# Patient Record
Sex: FEMALE | Race: OTHER | HISPANIC OR LATINO | Employment: OTHER | URBAN - METROPOLITAN AREA
[De-identification: names, ages, dates, MRNs, and addresses within clinical notes are randomized per-mention and may not be internally consistent; named-entity substitution may affect disease eponyms.]

---

## 2018-10-12 ENCOUNTER — TRANSCRIBE ORDERS (OUTPATIENT)
Dept: LAB | Facility: HOSPITAL | Age: 63
End: 2018-10-12

## 2018-10-12 ENCOUNTER — APPOINTMENT (OUTPATIENT)
Dept: LAB | Facility: HOSPITAL | Age: 63
End: 2018-10-12
Payer: MEDICARE

## 2018-10-12 DIAGNOSIS — Z00.01 ENCOUNTER FOR GENERAL ADULT MEDICAL EXAMINATION WITH ABNORMAL FINDINGS: Primary | ICD-10-CM

## 2018-10-12 DIAGNOSIS — R73.01 IMPAIRED FASTING GLUCOSE: ICD-10-CM

## 2018-10-12 DIAGNOSIS — E55.9 AVITAMINOSIS D: ICD-10-CM

## 2018-10-12 DIAGNOSIS — Z00.01 ENCOUNTER FOR GENERAL ADULT MEDICAL EXAMINATION WITH ABNORMAL FINDINGS: ICD-10-CM

## 2018-10-12 DIAGNOSIS — Z11.59 SCREENING EXAMINATION FOR POLIOMYELITIS: ICD-10-CM

## 2018-10-12 LAB
25(OH)D3 SERPL-MCNC: 20.8 NG/ML (ref 30–100)
ALBUMIN SERPL BCP-MCNC: 3.8 G/DL (ref 3.5–5)
ALP SERPL-CCNC: 75 U/L (ref 46–116)
ALT SERPL W P-5'-P-CCNC: 29 U/L (ref 12–78)
ANION GAP SERPL CALCULATED.3IONS-SCNC: 5 MMOL/L (ref 4–13)
AST SERPL W P-5'-P-CCNC: 21 U/L (ref 5–45)
BASOPHILS # BLD AUTO: 0.04 THOUSANDS/ΜL (ref 0–0.1)
BASOPHILS NFR BLD AUTO: 1 % (ref 0–1)
BILIRUB SERPL-MCNC: 0.43 MG/DL (ref 0.2–1)
BUN SERPL-MCNC: 10 MG/DL (ref 5–25)
CALCIUM SERPL-MCNC: 9.3 MG/DL (ref 8.3–10.1)
CHLORIDE SERPL-SCNC: 105 MMOL/L (ref 100–108)
CHOLEST SERPL-MCNC: 234 MG/DL (ref 50–200)
CO2 SERPL-SCNC: 26 MMOL/L (ref 21–32)
CREAT SERPL-MCNC: 0.89 MG/DL (ref 0.6–1.3)
EOSINOPHIL # BLD AUTO: 0.13 THOUSAND/ΜL (ref 0–0.61)
EOSINOPHIL NFR BLD AUTO: 2 % (ref 0–6)
ERYTHROCYTE [DISTWIDTH] IN BLOOD BY AUTOMATED COUNT: 12.9 % (ref 11.6–15.1)
EST. AVERAGE GLUCOSE BLD GHB EST-MCNC: 128 MG/DL
GFR SERPL CREATININE-BSD FRML MDRD: 68 ML/MIN/1.73SQ M
GLUCOSE P FAST SERPL-MCNC: 83 MG/DL (ref 65–99)
HBA1C MFR BLD: 6.1 % (ref 4.2–6.3)
HCT VFR BLD AUTO: 39.8 % (ref 34.8–46.1)
HCV AB SER QL: NORMAL
HDLC SERPL-MCNC: 54 MG/DL (ref 40–60)
HGB BLD-MCNC: 12.9 G/DL (ref 11.5–15.4)
IMM GRANULOCYTES # BLD AUTO: 0.01 THOUSAND/UL (ref 0–0.2)
IMM GRANULOCYTES NFR BLD AUTO: 0 % (ref 0–2)
LDLC SERPL CALC-MCNC: 142 MG/DL (ref 0–100)
LYMPHOCYTES # BLD AUTO: 1.86 THOUSANDS/ΜL (ref 0.6–4.47)
LYMPHOCYTES NFR BLD AUTO: 33 % (ref 14–44)
MCH RBC QN AUTO: 27.9 PG (ref 26.8–34.3)
MCHC RBC AUTO-ENTMCNC: 32.4 G/DL (ref 31.4–37.4)
MCV RBC AUTO: 86 FL (ref 82–98)
MONOCYTES # BLD AUTO: 0.43 THOUSAND/ΜL (ref 0.17–1.22)
MONOCYTES NFR BLD AUTO: 8 % (ref 4–12)
NEUTROPHILS # BLD AUTO: 3.14 THOUSANDS/ΜL (ref 1.85–7.62)
NEUTS SEG NFR BLD AUTO: 56 % (ref 43–75)
NONHDLC SERPL-MCNC: 180 MG/DL
NRBC BLD AUTO-RTO: 0 /100 WBCS
PLATELET # BLD AUTO: 385 THOUSANDS/UL (ref 149–390)
PMV BLD AUTO: 10.1 FL (ref 8.9–12.7)
POTASSIUM SERPL-SCNC: 4.3 MMOL/L (ref 3.5–5.3)
PROT SERPL-MCNC: 7.3 G/DL (ref 6.4–8.2)
RBC # BLD AUTO: 4.62 MILLION/UL (ref 3.81–5.12)
SODIUM SERPL-SCNC: 136 MMOL/L (ref 136–145)
TRIGL SERPL-MCNC: 192 MG/DL
TSH SERPL DL<=0.05 MIU/L-ACNC: 3.5 UIU/ML (ref 0.36–3.74)
WBC # BLD AUTO: 5.61 THOUSAND/UL (ref 4.31–10.16)

## 2018-10-12 PROCEDURE — 85025 COMPLETE CBC W/AUTO DIFF WBC: CPT

## 2018-10-12 PROCEDURE — 87086 URINE CULTURE/COLONY COUNT: CPT

## 2018-10-12 PROCEDURE — 83036 HEMOGLOBIN GLYCOSYLATED A1C: CPT

## 2018-10-12 PROCEDURE — 80053 COMPREHEN METABOLIC PANEL: CPT

## 2018-10-12 PROCEDURE — 36415 COLL VENOUS BLD VENIPUNCTURE: CPT

## 2018-10-12 PROCEDURE — 80061 LIPID PANEL: CPT

## 2018-10-12 PROCEDURE — 86803 HEPATITIS C AB TEST: CPT

## 2018-10-12 PROCEDURE — 84443 ASSAY THYROID STIM HORMONE: CPT

## 2018-10-12 PROCEDURE — 82306 VITAMIN D 25 HYDROXY: CPT

## 2018-10-12 PROCEDURE — 87147 CULTURE TYPE IMMUNOLOGIC: CPT

## 2018-10-13 LAB
BACTERIA UR CULT: ABNORMAL
BACTERIA UR CULT: ABNORMAL

## 2019-03-20 ENCOUNTER — TRANSCRIBE ORDERS (OUTPATIENT)
Dept: ADMINISTRATIVE | Facility: HOSPITAL | Age: 64
End: 2019-03-20

## 2019-03-20 DIAGNOSIS — Z12.31 ENCOUNTER FOR SCREENING MAMMOGRAM FOR MALIGNANT NEOPLASM OF BREAST: ICD-10-CM

## 2019-03-20 DIAGNOSIS — Z13.6 SCREENING FOR AAA (AORTIC ABDOMINAL ANEURYSM): Primary | ICD-10-CM

## 2019-04-01 ENCOUNTER — HOSPITAL ENCOUNTER (OUTPATIENT)
Dept: RADIOLOGY | Facility: HOSPITAL | Age: 64
Discharge: HOME | End: 2019-04-01
Payer: COMMERCIAL

## 2019-04-01 VITALS — HEIGHT: 58 IN | WEIGHT: 110 LBS | BODY MASS INDEX: 23.09 KG/M2

## 2019-04-01 DIAGNOSIS — Z12.31 ENCOUNTER FOR SCREENING MAMMOGRAM FOR MALIGNANT NEOPLASM OF BREAST: ICD-10-CM

## 2019-04-01 DIAGNOSIS — Z87.891 SMOKING HISTORY: ICD-10-CM

## 2019-04-01 DIAGNOSIS — Z13.6 SCREENING FOR AAA (AORTIC ABDOMINAL ANEURYSM): ICD-10-CM

## 2019-04-01 PROCEDURE — 77067 SCR MAMMO BI INCL CAD: CPT

## 2019-04-01 PROCEDURE — 76706 US ABDL AORTA SCREEN AAA: CPT

## 2019-04-03 ENCOUNTER — LAB REQUISITION (OUTPATIENT)
Dept: LAB | Facility: HOSPITAL | Age: 64
End: 2019-04-03
Payer: COMMERCIAL

## 2019-04-03 DIAGNOSIS — Z12.11 ENCOUNTER FOR SCREENING FOR MALIGNANT NEOPLASM OF COLON: ICD-10-CM

## 2019-04-03 DIAGNOSIS — Z00.00 ENCOUNTER FOR GENERAL ADULT MEDICAL EXAMINATION WITHOUT ABNORMAL FINDINGS: ICD-10-CM

## 2019-04-03 LAB — HEMOCCULT STL QL IA: NEGATIVE

## 2019-04-03 PROCEDURE — G0328 FECAL BLOOD SCRN IMMUNOASSAY: HCPCS | Performed by: FAMILY MEDICINE

## 2019-04-05 ENCOUNTER — HOSPITAL ENCOUNTER (OUTPATIENT)
Dept: ULTRASOUND IMAGING | Facility: CLINIC | Age: 64
Discharge: HOME | End: 2019-04-05
Payer: COMMERCIAL

## 2019-04-05 ENCOUNTER — HOSPITAL ENCOUNTER (OUTPATIENT)
Dept: MAMMOGRAPHY | Facility: CLINIC | Age: 64
Discharge: HOME | End: 2019-04-05
Payer: COMMERCIAL

## 2019-04-05 VITALS — WEIGHT: 110 LBS | HEIGHT: 58 IN | BODY MASS INDEX: 23.09 KG/M2

## 2019-04-05 DIAGNOSIS — R92.8 ABNORMAL MAMMOGRAM: ICD-10-CM

## 2019-04-05 PROCEDURE — 76642 ULTRASOUND BREAST LIMITED: CPT

## 2019-04-05 PROCEDURE — 77065 DX MAMMO INCL CAD UNI: CPT

## 2019-09-10 ENCOUNTER — TRANSCRIBE ORDERS (OUTPATIENT)
Dept: ADMINISTRATIVE | Facility: HOSPITAL | Age: 64
End: 2019-09-10

## 2019-09-10 DIAGNOSIS — R92.8 ABNORMAL MAMMOGRAM: Primary | ICD-10-CM

## 2019-09-10 DIAGNOSIS — R01.1 MURMUR: ICD-10-CM

## 2019-10-17 ENCOUNTER — HOSPITAL ENCOUNTER (OUTPATIENT)
Dept: MAMMOGRAPHY | Facility: CLINIC | Age: 64
Discharge: HOME | End: 2019-10-17
Payer: COMMERCIAL

## 2019-10-17 ENCOUNTER — HOSPITAL ENCOUNTER (OUTPATIENT)
Dept: ULTRASOUND IMAGING | Facility: CLINIC | Age: 64
Discharge: HOME | End: 2019-10-17
Payer: COMMERCIAL

## 2019-10-17 DIAGNOSIS — R92.8 ABNORMAL MAMMOGRAM: ICD-10-CM

## 2019-10-17 PROCEDURE — 77065 DX MAMMO INCL CAD UNI: CPT

## 2019-10-28 ENCOUNTER — TRANSCRIBE ORDERS (OUTPATIENT)
Dept: LAB | Facility: HOSPITAL | Age: 64
End: 2019-10-28

## 2019-10-28 ENCOUNTER — APPOINTMENT (OUTPATIENT)
Dept: LAB | Facility: HOSPITAL | Age: 64
End: 2019-10-28
Payer: COMMERCIAL

## 2019-10-28 DIAGNOSIS — R20.9 DISTURBANCE OF SKIN SENSATION: ICD-10-CM

## 2019-10-28 DIAGNOSIS — E78.00 PURE HYPERCHOLESTEROLEMIA: ICD-10-CM

## 2019-10-28 DIAGNOSIS — R73.01 IMPAIRED FASTING GLUCOSE: ICD-10-CM

## 2019-10-28 DIAGNOSIS — E55.9 AVITAMINOSIS D: ICD-10-CM

## 2019-10-28 DIAGNOSIS — R73.01 IMPAIRED FASTING GLUCOSE: Primary | ICD-10-CM

## 2019-10-28 LAB
25(OH)D3 SERPL-MCNC: 14.5 NG/ML (ref 30–100)
ALBUMIN SERPL BCP-MCNC: 3.9 G/DL (ref 3.5–5)
ALP SERPL-CCNC: 82 U/L (ref 46–116)
ALT SERPL W P-5'-P-CCNC: 24 U/L (ref 12–78)
ANION GAP SERPL CALCULATED.3IONS-SCNC: 5 MMOL/L (ref 4–13)
AST SERPL W P-5'-P-CCNC: 16 U/L (ref 5–45)
BASOPHILS # BLD AUTO: 0.05 THOUSANDS/ΜL (ref 0–0.1)
BASOPHILS NFR BLD AUTO: 1 % (ref 0–1)
BILIRUB SERPL-MCNC: 0.42 MG/DL (ref 0.2–1)
BUN SERPL-MCNC: 15 MG/DL (ref 5–25)
CALCIUM SERPL-MCNC: 9.6 MG/DL (ref 8.3–10.1)
CHLORIDE SERPL-SCNC: 106 MMOL/L (ref 100–108)
CHOLEST SERPL-MCNC: 247 MG/DL (ref 50–200)
CO2 SERPL-SCNC: 28 MMOL/L (ref 21–32)
CREAT SERPL-MCNC: 0.83 MG/DL (ref 0.6–1.3)
CRP SERPL QL: 9.3 MG/L
EOSINOPHIL # BLD AUTO: 0.08 THOUSAND/ΜL (ref 0–0.61)
EOSINOPHIL NFR BLD AUTO: 1 % (ref 0–6)
ERYTHROCYTE [DISTWIDTH] IN BLOOD BY AUTOMATED COUNT: 13.3 % (ref 11.6–15.1)
EST. AVERAGE GLUCOSE BLD GHB EST-MCNC: 117 MG/DL
FOLATE SERPL-MCNC: 17.9 NG/ML (ref 3.1–17.5)
GFR SERPL CREATININE-BSD FRML MDRD: 74 ML/MIN/1.73SQ M
GLUCOSE P FAST SERPL-MCNC: 89 MG/DL (ref 65–99)
HBA1C MFR BLD: 5.7 % (ref 4.2–6.3)
HCT VFR BLD AUTO: 40.3 % (ref 34.8–46.1)
HDLC SERPL-MCNC: 65 MG/DL
HGB BLD-MCNC: 12.7 G/DL (ref 11.5–15.4)
IMM GRANULOCYTES # BLD AUTO: 0.01 THOUSAND/UL (ref 0–0.2)
IMM GRANULOCYTES NFR BLD AUTO: 0 % (ref 0–2)
LDLC SERPL CALC-MCNC: 160 MG/DL (ref 0–100)
LYMPHOCYTES # BLD AUTO: 1.49 THOUSANDS/ΜL (ref 0.6–4.47)
LYMPHOCYTES NFR BLD AUTO: 24 % (ref 14–44)
MCH RBC QN AUTO: 27.7 PG (ref 26.8–34.3)
MCHC RBC AUTO-ENTMCNC: 31.5 G/DL (ref 31.4–37.4)
MCV RBC AUTO: 88 FL (ref 82–98)
MONOCYTES # BLD AUTO: 0.41 THOUSAND/ΜL (ref 0.17–1.22)
MONOCYTES NFR BLD AUTO: 7 % (ref 4–12)
NEUTROPHILS # BLD AUTO: 4.25 THOUSANDS/ΜL (ref 1.85–7.62)
NEUTS SEG NFR BLD AUTO: 67 % (ref 43–75)
NONHDLC SERPL-MCNC: 182 MG/DL
NRBC BLD AUTO-RTO: 0 /100 WBCS
PLATELET # BLD AUTO: 391 THOUSANDS/UL (ref 149–390)
PMV BLD AUTO: 10.2 FL (ref 8.9–12.7)
POTASSIUM SERPL-SCNC: 3.8 MMOL/L (ref 3.5–5.3)
PROT SERPL-MCNC: 7.8 G/DL (ref 6.4–8.2)
RBC # BLD AUTO: 4.59 MILLION/UL (ref 3.81–5.12)
SODIUM SERPL-SCNC: 139 MMOL/L (ref 136–145)
TRIGL SERPL-MCNC: 112 MG/DL
VIT B12 SERPL-MCNC: 1583 PG/ML (ref 100–900)
WBC # BLD AUTO: 6.29 THOUSAND/UL (ref 4.31–10.16)

## 2019-10-28 PROCEDURE — 85025 COMPLETE CBC W/AUTO DIFF WBC: CPT

## 2019-10-28 PROCEDURE — 80061 LIPID PANEL: CPT

## 2019-10-28 PROCEDURE — 82306 VITAMIN D 25 HYDROXY: CPT

## 2019-10-28 PROCEDURE — 83036 HEMOGLOBIN GLYCOSYLATED A1C: CPT

## 2019-10-28 PROCEDURE — 80053 COMPREHEN METABOLIC PANEL: CPT

## 2019-10-28 PROCEDURE — 82607 VITAMIN B-12: CPT

## 2019-10-28 PROCEDURE — 86140 C-REACTIVE PROTEIN: CPT

## 2019-10-28 PROCEDURE — 82746 ASSAY OF FOLIC ACID SERUM: CPT

## 2019-10-28 PROCEDURE — 36415 COLL VENOUS BLD VENIPUNCTURE: CPT

## 2019-10-31 ENCOUNTER — HOSPITAL ENCOUNTER (OUTPATIENT)
Dept: NON INVASIVE DIAGNOSTICS | Facility: HOSPITAL | Age: 64
Discharge: HOME | End: 2019-10-31
Payer: COMMERCIAL

## 2019-10-31 DIAGNOSIS — R01.1 MURMUR: ICD-10-CM

## 2019-10-31 PROCEDURE — 93306 TTE W/DOPPLER COMPLETE: CPT

## 2019-10-31 PROCEDURE — 93306 TTE W/DOPPLER COMPLETE: CPT | Performed by: INTERNAL MEDICINE

## 2020-01-31 ENCOUNTER — TRANSCRIBE ORDERS (OUTPATIENT)
Dept: LAB | Facility: CLINIC | Age: 65
End: 2020-01-31

## 2020-01-31 DIAGNOSIS — E78.00 PURE HYPERCHOLESTEROLEMIA: Primary | ICD-10-CM

## 2020-01-31 DIAGNOSIS — R73.09 OTHER ABNORMAL GLUCOSE: ICD-10-CM

## 2020-02-06 ENCOUNTER — HOSPITAL ENCOUNTER (OUTPATIENT)
Dept: RADIOLOGY | Facility: HOSPITAL | Age: 65
Discharge: HOME | End: 2020-02-06
Payer: COMMERCIAL

## 2020-02-06 ENCOUNTER — TRANSCRIBE ORDERS (OUTPATIENT)
Dept: RADIOLOGY | Facility: HOSPITAL | Age: 65
End: 2020-02-06

## 2020-02-06 DIAGNOSIS — R05.9 COUGH: Primary | ICD-10-CM

## 2020-02-06 DIAGNOSIS — R05.9 COUGH: ICD-10-CM

## 2020-02-06 PROCEDURE — 71046 X-RAY EXAM CHEST 2 VIEWS: CPT

## 2020-06-03 ENCOUNTER — TRANSCRIBE ORDERS (OUTPATIENT)
Dept: ADMINISTRATIVE | Facility: HOSPITAL | Age: 65
End: 2020-06-03

## 2020-06-03 DIAGNOSIS — N63.0 LUMP IN FEMALE BREAST: Primary | ICD-10-CM

## 2020-06-22 ENCOUNTER — APPOINTMENT (OUTPATIENT)
Dept: ULTRASOUND IMAGING | Facility: CLINIC | Age: 65
End: 2020-06-22
Payer: COMMERCIAL

## 2020-06-22 ENCOUNTER — HOSPITAL ENCOUNTER (OUTPATIENT)
Dept: MAMMOGRAPHY | Facility: CLINIC | Age: 65
Discharge: HOME | End: 2020-06-22
Payer: COMMERCIAL

## 2020-06-22 VITALS — TEMPERATURE: 97.7 F | WEIGHT: 110 LBS | BODY MASS INDEX: 23.09 KG/M2 | HEIGHT: 58 IN

## 2020-06-22 DIAGNOSIS — R92.8 ABNORMAL MAMMOGRAM: ICD-10-CM

## 2020-06-22 DIAGNOSIS — N63.0 LUMP IN FEMALE BREAST: ICD-10-CM

## 2020-06-22 PROCEDURE — 77066 DX MAMMO INCL CAD BI: CPT

## 2020-06-22 PROCEDURE — G0279 TOMOSYNTHESIS, MAMMO: HCPCS

## 2020-06-22 NOTE — PROGRESS NOTES
Met with patient and Dr Jermaine Bolivar regarding recommendation for;      __x___ RIGHT ______LEFT      _____Ultrasound guided  ___x___Stereotactic  Breast biopsy  __x___Verbalized understanding        Blood thinners:  _____yes __x___no    Date stopped: ___n/a________    Biopsy teaching sheet given:  ___x____yes ______no

## 2020-07-01 ENCOUNTER — HOSPITAL ENCOUNTER (OUTPATIENT)
Dept: MAMMOGRAPHY | Facility: CLINIC | Age: 65
Discharge: HOME | End: 2020-07-01
Admitting: RADIOLOGY
Payer: COMMERCIAL

## 2020-07-01 ENCOUNTER — HOSPITAL ENCOUNTER (OUTPATIENT)
Dept: MAMMOGRAPHY | Facility: CLINIC | Age: 65
Discharge: HOME | End: 2020-07-01
Payer: COMMERCIAL

## 2020-07-01 VITALS — DIASTOLIC BLOOD PRESSURE: 88 MMHG | TEMPERATURE: 96.3 F | HEART RATE: 78 BPM | SYSTOLIC BLOOD PRESSURE: 124 MMHG

## 2020-07-01 DIAGNOSIS — R92.8 ABNORMAL MAMMOGRAM: ICD-10-CM

## 2020-07-01 PROCEDURE — 88305 TISSUE EXAM BY PATHOLOGIST: CPT | Performed by: PATHOLOGY

## 2020-07-01 PROCEDURE — 88361 TUMOR IMMUNOHISTOCHEM/COMPUT: CPT | Performed by: PATHOLOGY

## 2020-07-01 PROCEDURE — 19081 BX BREAST 1ST LESION STRTCTC: CPT

## 2020-07-01 RX ORDER — LIDOCAINE HYDROCHLORIDE AND EPINEPHRINE 10; 10 MG/ML; UG/ML
1 INJECTION, SOLUTION INFILTRATION; PERINEURAL ONCE
Status: COMPLETED | OUTPATIENT
Start: 2020-07-01 | End: 2020-07-01

## 2020-07-01 RX ORDER — LIDOCAINE HYDROCHLORIDE 10 MG/ML
5 INJECTION, SOLUTION EPIDURAL; INFILTRATION; INTRACAUDAL; PERINEURAL ONCE
Status: COMPLETED | OUTPATIENT
Start: 2020-07-01 | End: 2020-07-01

## 2020-07-01 RX ADMIN — LIDOCAINE HYDROCHLORIDE 5 ML: 10 INJECTION, SOLUTION EPIDURAL; INFILTRATION; INTRACAUDAL; PERINEURAL at 13:03

## 2020-07-01 RX ADMIN — LIDOCAINE HYDROCHLORIDE,EPINEPHRINE BITARTRATE 1 ML: 10; .01 INJECTION, SOLUTION INFILTRATION; PERINEURAL at 13:03

## 2020-07-01 NOTE — DISCHARGE INSTR - OTHER ORDERS
POST LARGE CORE BREAST BIOPSY PATIENT INFORMATION      1  Place an ice pack inside your bra over the top of the dressing every hour for 20 minutes (20 minutes on, 60 minutes off)  Do this until bedtime  2  Do not shower or bathe until the following morning  3  You may bathe your breast carefully with the steri-strips in place  Be careful    Not to loosen them  The steri-strips will fall off in 3-5 days  4  You may have mild discomfort, and you may have some bruising where the   Needle entered the skin  This should clear within 5-7 days  5  If you need medicine for discomfort, take acetaminophen products such as   Tylenol  You may also take Advil or Motrin products  6  Do not participate in strenuous activities such as-tennis, aerobics, skiing,  Weight lifting, etc  for 24 hours  Refrain from swimming/soaking for 72 hours  7  Wearing a bra for sleeping may be more comfortable for the first 24-48 hours  8  Watch for continued bleeding, pain or fever over 101; please call with any questions or concerns  For procedures done at the [facility]  [address] call:  Annie Jordan RN at [phone]  Nubia Hawley RN at [phone]                    *After 4 PM call the Interventional Radiology Department                    [phone] and ask to speak with the nurse on call  For procedures done at the [address] [place] [address] call:         Rena Collet RN at   *After 4 PM call the Interventional Radiology Department   [phone] and ask to speak with the nurse on call  For procedures done at [address] [place] [address] call: The Radiology Nurse at [phone]  *After 4 PM call your physician, or go to the Emergency Department  9          The final results of your biopsy are usually available within one week

## 2020-07-01 NOTE — PROGRESS NOTES
Procedure type:    _____ultrasound guided ___x__stereotactic    Breast:    _____Left __x___Right    Location: 10 oclock    Needle: 8g Revolve    # of passes: 5 cores (3 cores with calcs, 2 cores without calcs)    Clip: Mammomark triple twist    Performed by: Dr Radha Sanches held for 5 minutes by: Erica Ulrich Strips:    __x___yes _____no    Band aid:    __x___yes_____no    Tape and guaze:    _____yes __x___no    Tolerated procedure:    __x___yes _____no

## 2020-07-01 NOTE — PROGRESS NOTES
Ice pack given:    ___x__yes _____no    Discharge instructions signed by patient:    __x___yes _____no    Discharge instructions given to patient:    __x___yes _____no    Discharged via:    __x___amulatory    _____wheelchair    _____stretcher    Stable on discharge:    __x___yes ____no

## 2020-07-02 ENCOUNTER — TELEPHONE (OUTPATIENT)
Dept: SURGICAL ONCOLOGY | Facility: CLINIC | Age: 65
End: 2020-07-02

## 2020-07-02 NOTE — TELEPHONE ENCOUNTER
New Patient Encounter    New Patient Intake Form   Patient Details:  Morteza Puckett  1955  [id number]    Background Information:  [address] starts by opening a telephone encounter and gathering the following information   Who is calling to schedule? If not self, relationship to patient? provider   Referring Provider [facility]   What is the diagnosis? Right DCIS   Is this diagnosis confirmed? Yes   When was the diagnosis? 7/2   Is there a confirmed diagnosis from a biopsy/tissue reviewed by pathology? yes   Is patient aware of diagnosis? Yes   Is there a personal history of cancer and what kind? no   Is there a family history of cancer and what kind? no   Reason for visit? New Diagnosis   Have you had any imaging or labs done? If so: when, where? Yes  [facility]   Are records in EPIC? yes   Was the patient told to bring a disk? no   Does the patient smoke or Vape? no   If yes, how many packs or cartridges per day? na   Scheduling Information:   Preferred [place]: [facility]     Are there any dates/time the patient cannot be seen? na   Miscellaneous: na   After completing the above information, please route to Financial Counselor and the appropriate Nurse Navigator for review

## 2020-07-06 ENCOUNTER — TELEPHONE (OUTPATIENT)
Dept: MAMMOGRAPHY | Facility: CLINIC | Age: 65
End: 2020-07-06

## 2020-07-06 NOTE — PROGRESS NOTES
Post procedure call completed    Bleeding: _____yes __x___no    Pain: __x___yes ______no "sore"    Redness/Swelling: ______yes ___x___no    Band aid removed: __x___yes _____no    Steri-Strips intact: ___x___yes _____no    Lu Mack has bruising

## 2020-07-14 ENCOUNTER — TELEPHONE (OUTPATIENT)
Dept: SURGICAL ONCOLOGY | Facility: CLINIC | Age: 65
End: 2020-07-14

## 2020-07-15 PROBLEM — D05.11 DUCTAL CARCINOMA IN SITU (DCIS) OF RIGHT BREAST: Status: ACTIVE | Noted: 2020-07-15

## 2020-07-16 ENCOUNTER — CONSULT (OUTPATIENT)
Dept: SURGICAL ONCOLOGY | Facility: CLINIC | Age: 65
End: 2020-07-16
Payer: COMMERCIAL

## 2020-07-16 ENCOUNTER — TRANSCRIBE ORDERS (OUTPATIENT)
Dept: LAB | Facility: CLINIC | Age: 65
End: 2020-07-16

## 2020-07-16 ENCOUNTER — APPOINTMENT (OUTPATIENT)
Dept: LAB | Facility: CLINIC | Age: 65
End: 2020-07-16
Payer: COMMERCIAL

## 2020-07-16 VITALS
RESPIRATION RATE: 16 BRPM | SYSTOLIC BLOOD PRESSURE: 122 MMHG | TEMPERATURE: 97.8 F | HEIGHT: 58 IN | HEART RATE: 90 BPM | DIASTOLIC BLOOD PRESSURE: 84 MMHG | BODY MASS INDEX: 25.19 KG/M2 | WEIGHT: 120 LBS

## 2020-07-16 DIAGNOSIS — D05.11 INTRADUCTAL CARCINOMA IN SITU OF RIGHT BREAST: ICD-10-CM

## 2020-07-16 DIAGNOSIS — D05.11 INTRADUCTAL CARCINOMA IN SITU OF RIGHT BREAST: Primary | ICD-10-CM

## 2020-07-16 DIAGNOSIS — D05.11 DUCTAL CARCINOMA IN SITU (DCIS) OF RIGHT BREAST: Primary | ICD-10-CM

## 2020-07-16 PROCEDURE — 99205 OFFICE O/P NEW HI 60 MIN: CPT | Performed by: SURGERY

## 2020-07-16 PROCEDURE — 36415 COLL VENOUS BLD VENIPUNCTURE: CPT

## 2020-07-16 NOTE — PROGRESS NOTES
Surgical Oncology Consult Note       [address] [facility]  [facility] [place]  [address]  [place] [address]    Lissette Cherylleonidbrenda  1955  [id number]  [address] [facility]  [facility] [place]  [address] [facility] [address]      Chief Complaint:     Chief Complaint   Patient presents with    Consult       Assessment and Plan:   Assessment/Plan   Patient presents with a new diagnosis of ductal carcinoma in situ based on microcalcifications the cover an area of approximately 6-7 cm  This was grade 1 ER [place] strongly positive  I have recommended a mastectomy, explaining that even if a 2nd area was biopsied and was negative leaving the calcifications behind would cause any significance concern for recurrence  Patient was agreeable to seeing a plastic surgeon in moving forward with plans for mastectomy  She presents with her daughter  They are considering genetic testing  Oncology History:        Ductal carcinoma in situ (DCIS) of right breast    7/1/2020 Biopsy     Right breast stereotactic biopsy  A  10 o'clock, with calcifications  Ductal carcinoma in situ  Grade 1        B  10 o'clock, without calcifications  Ductal carcinoma in situ   Grade 1    Concordant  Appears multifocal  Calcifications cover an area of potentially up to 6 cm in AP dimension  US of right axilla not performed  Left breast clear  History of Present Illness: This is a 59-year-old woman who in April of 2019 had a mammogram which demonstrated calcifications in the right upper outer quadrant  Six-month follow-up mammogram was recommended which demonstrated stable findings  She had a repeat six-month follow-up mammogram (bilateral diagnostic) which demonstrated the microcalcifications had increased in number and a biopsy was recommended and performed (stereotactic)    The biopsy demonstrated ductal carcinoma in situ, grade 1, % %  The calcifications spanning area approximately 6 cm from anterior to posterior  Review of Systems:   Review of Systems   Constitutional: Negative for activity change, appetite change and fatigue  HENT: Negative  Eyes: Negative  Respiratory: Negative for cough, shortness of breath and wheezing  Cardiovascular: Negative for chest pain and leg swelling  Gastrointestinal: Negative  Endocrine: Negative  Genitourinary: Negative  Musculoskeletal:        No new changes or complaints of bone pain   Skin: Negative  Allergic/Immunologic: Negative  Neurological: Negative  Hematological: Negative  Psychiatric/Behavioral: Negative  Past Medical History:      Patient Active Problem List   Diagnosis    Ductal carcinoma in situ (DCIS) of right breast      No past medical history on file       Past Surgical History:   Procedure Laterality Date    MAMMO STEREOTACTIC BREAST BIOPSY RIGHT (ALL INC) Right 7/1/2020        Family History   Problem Relation Age of Onset    Endometrial cancer Mother 35    No Known Problems Father     No Known Problems Sister     No Known Problems Daughter     No Known Problems Maternal Grandmother     No Known Problems Maternal Grandfather     No Known Problems Paternal Grandmother     No Known Problems Paternal Grandfather     No Known Problems Maternal Aunt     No Known Problems Maternal Aunt     No Known Problems Paternal Aunt     No Known Problems Paternal Aunt     No Known Problems Paternal Aunt         Social History     Socioeconomic History    Marital status: Single     Spouse name: Not on file    Number of children: Not on file    Years of education: Not on file    Highest education level: Not on file   Occupational History    Not on file   Social Needs    Financial resource strain: Not on file    Food insecurity:     Worry: Not on file     Inability: Not on file    Transportation needs:     Medical: Not on file     Non-medical: Not on file   Tobacco Use    Smoking status: Not on file   Substance and Sexual Activity    Alcohol use: Not on file    Drug use: Not on file    Sexual activity: Not on file   Lifestyle    Physical activity:     Days per week: Not on file     Minutes per session: Not on file    Stress: Not on file   Relationships    Social connections:     Talks on phone: Not on file     Gets together: Not on file     Attends Buddhism service: Not on file     Active member of club or organization: Not on file     Attends meetings of clubs or organizations: Not on file     Relationship status: Not on file    Intimate partner violence:     Fear of current or ex partner: Not on file     Emotionally abused: Not on file     Physically abused: Not on file     Forced sexual activity: Not on file   Other Topics Concern    Not on file   Social History Narrative    Not on file        Current Outpatient Medications:     CALCIUM PO, Take by mouth, Disp: , Rfl:     Cholecalciferol (VITAMIN D3) 125 MCG (5000 UT) TABS, Take 5,000 Units by mouth daily, Disp: , Rfl:     Cyanocobalamin (B-12 PO), Take by mouth, Disp: , Rfl:     Omega-3 Fatty Acids (FISH OIL PO), Take by mouth, Disp: , Rfl:     TURMERIC CURCUMIN PO, Take by mouth, Disp: , Rfl:      Allergies   Allergen Reactions    Codeine Vomiting    Penicillins Hives       Physical Exam:     Vitals:    07/16/20 0901   BP: 122/84   Pulse: 90   Resp: 16   Temp: 97 8 °F (36 6 °C)     Physical Exam   Constitutional: She is oriented to person, place, and time  She appears well-developed and well-nourished  HENT:   Head: Normocephalic and atraumatic  Mouth/Throat: Oropharynx is clear and moist    Eyes: Pupils are equal, round, and reactive to light  EOM are normal    Neck: Normal range of motion  Neck supple  No JVD present  No tracheal deviation present  No thyromegaly present     Cardiovascular: Normal rate, regular rhythm, normal heart sounds and intact distal pulses  Exam reveals no gallop and no friction rub  No murmur heard  Pulmonary/Chest: Effort normal and breath sounds normal  No respiratory distress  She has no wheezes  She has no rales  Examination of the right breast demonstrates ecchymosis on the upper outer quadrant  There is a dominant mass which was not there prior to the biopsy according to the patient  Is consistent with a small hematoma  There are no other masses within the breast there are no worrisome skin findings  There is no axillary adenopathy  Examination of the left breast in both the sitting and supine position demonstrate no skin changes nipple discharge dominant masses or axillary adenopathy  Abdominal: Soft  She exhibits no distension and no mass  There is no hepatomegaly  There is no tenderness  There is no rebound and no guarding  Musculoskeletal: Normal range of motion  She exhibits no edema or tenderness  Lymphadenopathy:     She has no cervical adenopathy  Neurological: She is alert and oriented to person, place, and time  No cranial nerve deficit  Skin: Skin is warm and dry  No rash noted  No erythema  Psychiatric: She has a normal mood and affect  Her behavior is normal    Vitals reviewed  Results:   I have reviewed her mammogram   The calcifications cover an area of approximately 6-7 cm  The upper outer quadrant regions have recently changed and were biopsied and shown to be DCIS  Discussion/Summary:   Currently this is staged as ductal carcinoma in situ, stage 0, ER [place] positive grade 1  Given the relatively large area of calcifications of recommended she undergo a mastectomy, explaining that removing the calcifications them cells would leave a relatively disfiguring form  We talked about breast reconstruction verses being flat the patient is interested in reconstruction of recommended she see a plastic surgeon for discussion/Education    The patient has no family history of breast or ovarian cancer but is interested in possibly talking to a Genetics counselor  We will coordinate that today  We will see her back in approximately 2 weeks  Advance Care Planning/Advance Directives:  I discussed the disease status, treatment plans and follow-up with the patient

## 2020-07-19 LAB — MISCELLANEOUS LAB TEST RESULT: NORMAL

## 2020-07-23 ENCOUNTER — TELEPHONE (OUTPATIENT)
Dept: SURGICAL ONCOLOGY | Facility: CLINIC | Age: 65
End: 2020-07-23

## 2020-07-23 NOTE — TELEPHONE ENCOUNTER
Left message for patient to review negative genetic results  Patient called back and negative results were given  Patient appreciative of phone call  Confirmed appointment for next week with Dr Byron Saavedra

## 2020-07-29 ENCOUNTER — HOSPITAL ENCOUNTER (OUTPATIENT)
Dept: RADIOLOGY | Facility: HOSPITAL | Age: 65
Discharge: HOME | End: 2020-07-29
Attending: SURGERY
Payer: COMMERCIAL

## 2020-07-29 ENCOUNTER — APPOINTMENT (OUTPATIENT)
Dept: LAB | Facility: CLINIC | Age: 65
End: 2020-07-29
Payer: COMMERCIAL

## 2020-07-29 ENCOUNTER — LAB (OUTPATIENT)
Dept: LAB | Facility: CLINIC | Age: 65
End: 2020-07-29
Payer: COMMERCIAL

## 2020-07-29 ENCOUNTER — OFFICE VISIT (OUTPATIENT)
Dept: SURGICAL ONCOLOGY | Facility: CLINIC | Age: 65
End: 2020-07-29
Payer: COMMERCIAL

## 2020-07-29 VITALS
WEIGHT: 122 LBS | DIASTOLIC BLOOD PRESSURE: 80 MMHG | TEMPERATURE: 97.6 F | RESPIRATION RATE: 16 BRPM | SYSTOLIC BLOOD PRESSURE: 138 MMHG | HEART RATE: 105 BPM | BODY MASS INDEX: 25.61 KG/M2 | HEIGHT: 58 IN

## 2020-07-29 DIAGNOSIS — Z01.818 PREOP EXAMINATION: Primary | ICD-10-CM

## 2020-07-29 DIAGNOSIS — D05.11 DUCTAL CARCINOMA IN SITU (DCIS) OF RIGHT BREAST: ICD-10-CM

## 2020-07-29 DIAGNOSIS — Z13.71 BRCA GENE MUTATION NEGATIVE: ICD-10-CM

## 2020-07-29 LAB
ALBUMIN SERPL BCP-MCNC: 4.1 G/DL (ref 3.5–5)
ALP SERPL-CCNC: 90 U/L (ref 46–116)
ALT SERPL W P-5'-P-CCNC: 35 U/L (ref 12–78)
ANION GAP SERPL CALCULATED.3IONS-SCNC: 9 MMOL/L (ref 4–13)
AST SERPL W P-5'-P-CCNC: 25 U/L (ref 5–45)
ATRIAL RATE: 81 BPM
BASOPHILS # BLD AUTO: 0.06 THOUSANDS/ΜL (ref 0–0.1)
BASOPHILS NFR BLD AUTO: 1 % (ref 0–1)
BILIRUB SERPL-MCNC: 0.31 MG/DL (ref 0.2–1)
BUN SERPL-MCNC: 11 MG/DL (ref 5–25)
CALCIUM SERPL-MCNC: 10 MG/DL (ref 8.3–10.1)
CHLORIDE SERPL-SCNC: 102 MMOL/L (ref 100–108)
CO2 SERPL-SCNC: 29 MMOL/L (ref 21–32)
CREAT SERPL-MCNC: 0.86 MG/DL (ref 0.6–1.3)
EOSINOPHIL # BLD AUTO: 0.07 THOUSAND/ΜL (ref 0–0.61)
EOSINOPHIL NFR BLD AUTO: 1 % (ref 0–6)
ERYTHROCYTE [DISTWIDTH] IN BLOOD BY AUTOMATED COUNT: 13 % (ref 11.6–15.1)
GFR SERPL CREATININE-BSD FRML MDRD: 70 ML/MIN/1.73SQ M
GLUCOSE SERPL-MCNC: 88 MG/DL (ref 65–140)
HCT VFR BLD AUTO: 40.7 % (ref 34.8–46.1)
HGB BLD-MCNC: 13.1 G/DL (ref 11.5–15.4)
IMM GRANULOCYTES # BLD AUTO: 0.02 THOUSAND/UL (ref 0–0.2)
IMM GRANULOCYTES NFR BLD AUTO: 0 % (ref 0–2)
LYMPHOCYTES # BLD AUTO: 1.93 THOUSANDS/ΜL (ref 0.6–4.47)
LYMPHOCYTES NFR BLD AUTO: 30 % (ref 14–44)
MCH RBC QN AUTO: 27.9 PG (ref 26.8–34.3)
MCHC RBC AUTO-ENTMCNC: 32.2 G/DL (ref 31.4–37.4)
MCV RBC AUTO: 87 FL (ref 82–98)
MONOCYTES # BLD AUTO: 0.46 THOUSAND/ΜL (ref 0.17–1.22)
MONOCYTES NFR BLD AUTO: 7 % (ref 4–12)
NEUTROPHILS # BLD AUTO: 3.99 THOUSANDS/ΜL (ref 1.85–7.62)
NEUTS SEG NFR BLD AUTO: 61 % (ref 43–75)
NRBC BLD AUTO-RTO: 0 /100 WBCS
P AXIS: 54 DEGREES
PLATELET # BLD AUTO: 404 THOUSANDS/UL (ref 149–390)
PMV BLD AUTO: 10.2 FL (ref 8.9–12.7)
POTASSIUM SERPL-SCNC: 4.3 MMOL/L (ref 3.5–5.3)
PR INTERVAL: 150 MS
PROT SERPL-MCNC: 8.4 G/DL (ref 6.4–8.2)
QRS AXIS: 41 DEGREES
QRSD INTERVAL: 84 MS
QT INTERVAL: 376 MS
QTC INTERVAL: 436 MS
RBC # BLD AUTO: 4.69 MILLION/UL (ref 3.81–5.12)
SODIUM SERPL-SCNC: 140 MMOL/L (ref 136–145)
T WAVE AXIS: 36 DEGREES
VENTRICULAR RATE: 81 BPM
WBC # BLD AUTO: 6.53 THOUSAND/UL (ref 4.31–10.16)

## 2020-07-29 PROCEDURE — 71046 X-RAY EXAM CHEST 2 VIEWS: CPT

## 2020-07-29 PROCEDURE — 36415 COLL VENOUS BLD VENIPUNCTURE: CPT

## 2020-07-29 PROCEDURE — 80053 COMPREHEN METABOLIC PANEL: CPT

## 2020-07-29 PROCEDURE — 93010 ELECTROCARDIOGRAM REPORT: CPT | Performed by: INTERNAL MEDICINE

## 2020-07-29 PROCEDURE — 99214 OFFICE O/P EST MOD 30 MIN: CPT | Performed by: SURGERY

## 2020-07-29 PROCEDURE — 93005 ELECTROCARDIOGRAM TRACING: CPT

## 2020-07-29 PROCEDURE — 85025 COMPLETE CBC W/AUTO DIFF WBC: CPT

## 2020-07-29 NOTE — PROGRESS NOTES
Surgical Oncology Follow Up       [address] [facility]  [facility] [place]  [address] [facility] [address]    Anurag Ryann  1955  [id number]  [address]  [facility] [place]  [address] [address]    Chief Complaint   Patient presents with    Follow-up     2wk follow up pre op           Assessment & Plan:   Patient presents for right breast ductal carcinoma in situ  She has seen Saadia Severino   The patient prefers a mastectomy  Her genetic testing was negative  She preferred a unilateral mastectomy as long as the contralateral breast was clean  I explained that had been reviewed after her diagnosis of the cancer as well and remains clean of any evidence of malignancy  We subsequent went through the process of informed consent for a right mastectomy in conjunction with sentinel lymph node biopsy possible axillary dissection for ductal carcinoma in situ  We will coordinate this with Dr Marycarmen Figueroa office  Cancer History:        Ductal carcinoma in situ (DCIS) of right breast    7/1/2020 Biopsy     Right breast stereotactic biopsy  A  10 o'clock, with calcifications  Ductal carcinoma in situ  Grade 1        B  10 o'clock, without calcifications  Ductal carcinoma in situ   Grade 1    Concordant  Appears multifocal  Calcifications cover an area of potentially up to 6 cm in AP dimension  US of right axilla not performed  Left breast clear  7/16/2020 Genetic Testing     Genetic testing done - [facility]  The following genes were evaluated: LAURA, BRCA1, BRCA2, CDH1, CHEK2, PALB2, PTEN, STK11, TP53  Negative result  No pathogenic sequence variants or deletions/dupllications identified           Interval History:   The patient's genetic testing was negative  She is that with plastic surgery and has opted for a mastectomy        Review of Systems:   Review of Systems   Constitutional: Negative for activity change, appetite change and fatigue  HENT: Negative  Eyes: Negative  Respiratory: Negative for cough, shortness of breath and wheezing  Cardiovascular: Negative for chest pain and leg swelling  Gastrointestinal: Negative  Endocrine: Negative  Genitourinary: Negative  Musculoskeletal:        No new changes or complaints of bone pain   Skin: Negative  Allergic/Immunologic: Negative  Neurological: Negative  Hematological: Negative  Psychiatric/Behavioral: Negative  Past Medical History     Patient Active Problem List   Diagnosis    Ductal carcinoma in situ (DCIS) of right breast     No past medical history on file    Past Surgical History:   Procedure Laterality Date    MAMMO STEREOTACTIC BREAST BIOPSY RIGHT (ALL INC) Right 7/1/2020     Family History   Problem Relation Age of Onset    Endometrial cancer Mother 35    Cancer Mother 48        Bladder    No Known Problems Father     No Known Problems Sister     No Known Problems Daughter     No Known Problems Maternal Grandmother     No Known Problems Maternal Grandfather     No Known Problems Paternal Grandmother     No Known Problems Paternal Grandfather     No Known Problems Maternal Aunt     No Known Problems Maternal Aunt     No Known Problems Paternal Aunt     No Known Problems Paternal Aunt     No Known Problems Paternal Aunt      Social History     Socioeconomic History    Marital status: Single     Spouse name: Not on file    Number of children: Not on file    Years of education: Not on file    Highest education level: Not on file   Occupational History    Not on file   Social Needs    Financial resource strain: Not on file    Food insecurity:     Worry: Not on file     Inability: Not on file    Transportation needs:     Medical: Not on file     Non-medical: Not on file   Tobacco Use    Smoking status: Former Smoker     Types: Cigarettes     Last attempt to quit: 1993     Years since quittin 5    Smokeless tobacco: Never Used   Substance and Sexual Activity    Alcohol use: Not Currently    Drug use: Not on file    Sexual activity: Not on file   Lifestyle    Physical activity:     Days per week: Not on file     Minutes per session: Not on file    Stress: Not on file   Relationships    Social connections:     Talks on phone: Not on file     Gets together: Not on file     Attends Worship service: Not on file     Active member of club or organization: Not on file     Attends meetings of clubs or organizations: Not on file     Relationship status: Not on file    Intimate partner violence:     Fear of current or ex partner: Not on file     Emotionally abused: Not on file     Physically abused: Not on file     Forced sexual activity: Not on file   Other Topics Concern    Not on file   Social History Narrative    Not on file       Current Outpatient Medications:     CALCIUM PO, Take by mouth, Disp: , Rfl:     Cholecalciferol (VITAMIN D3) 125 MCG (5000 UT) TABS, Take 5,000 Units by mouth daily, Disp: , Rfl:     Cyanocobalamin (B-12 PO), Take by mouth, Disp: , Rfl:     Menaquinone-7 (VITAMIN K2 PO), Take by mouth daily, Disp: , Rfl:     NON FORMULARY, Take by mouth daily Glucose Support, Disp: , Rfl:     Omega-3 Fatty Acids (FISH OIL PO), Take by mouth, Disp: , Rfl:     TURMERIC CURCUMIN PO, Take by mouth, Disp: , Rfl:   Allergies   Allergen Reactions    Codeine Vomiting    Penicillins Hives       Physical Exam:     Vitals:    20 1319   BP: 138/80   Pulse: 105   Resp: 16   Temp: 97 6 °F (36 4 °C)     Physical Exam   Constitutional: She is oriented to person, place, and time  She appears well-developed and well-nourished  HENT:   Head: Normocephalic and atraumatic  Mouth/Throat: Oropharynx is clear and moist    Eyes: Pupils are equal, round, and reactive to light  EOM are normal    Neck: Normal range of motion  Neck supple  No JVD present  No tracheal deviation present  No thyromegaly present  Cardiovascular: Normal rate, regular rhythm, normal heart sounds and intact distal pulses  Exam reveals no gallop and no friction rub  No murmur heard  Pulmonary/Chest: Effort normal and breath sounds normal  No respiratory distress  She has no wheezes  She has no rales  Both breasts were examined in the sitting and supine position  There are no worrisome skin lesions, no nipple retraction and no nipple discharge  There are no dominant masses, axillary adenopathy or supraclavicular adenopathy on either side  Abdominal: Soft  She exhibits no distension and no mass  There is no hepatomegaly  There is no tenderness  There is no rebound and no guarding  Musculoskeletal: Normal range of motion  She exhibits no edema or tenderness  Lymphadenopathy:     She has no cervical adenopathy  Neurological: She is alert and oriented to person, place, and time  No cranial nerve deficit  Skin: Skin is warm and dry  No rash noted  No erythema  Psychiatric: She has a normal mood and affect  Her behavior is normal    Vitals reviewed  Results & Discussion:   The patient had questions regarding a bilateral mastectomy  I explained that her chances of having a cancer on the contralateral side is quite low  I further explained that following her diagnosis the radiologist had read reviewed her films and see no suspicious findings on the contralateral left side  The patient was reassured and prefers at this point a right mastectomy  We went through the process of informed consent for right mastectomy in conjunction with a sentinel lymph node biopsy and possible axillary dissection  I reviewed the complications as outlined on the consent form ranging from minor skin irritations to major cardiac events liver failure renal failure stroke and death  Also reviewed the complications of breast recurrence postmastectomy pain syndromes as well as lymphedema    All questions were answered the patient's satisfaction  We will coordinate the surgery at our next mutual [facility]  Advance Care Planning/Advance Directives:  I discussed the disease status, treatment plans and follow-up with the patient

## 2020-07-29 NOTE — H&P (VIEW-ONLY)
Surgical Oncology Follow Up       [address] [facility],[address]  [facility]  [address]  [place] [address]    Susy Mensah  1955  [id number]  [address] [facility]  [facility]  [address] [address]    Chief Complaint   Patient presents with    Follow-up     2wk follow up pre op           Assessment & Plan:   Patient presents for right breast ductal carcinoma in situ  She has seen Camila Severino   The patient prefers a mastectomy  Her genetic testing was negative  She preferred a unilateral mastectomy as long as the contralateral breast was clean  I explained that had been reviewed after her diagnosis of the cancer as well and remains clean of any evidence of malignancy  We subsequent went through the process of informed consent for a right mastectomy in conjunction with sentinel lymph node biopsy possible axillary dissection for ductal carcinoma in situ  We will coordinate this with Dr Calvert Poster office  Cancer History:        Ductal carcinoma in situ (DCIS) of right breast    7/1/2020 Biopsy     Right breast stereotactic biopsy  A  10 o'clock, with calcifications  Ductal carcinoma in situ  Grade 1        B  10 o'clock, without calcifications  Ductal carcinoma in situ   Grade 1    Concordant  Appears multifocal  Calcifications cover an area of potentially up to 6 cm in AP dimension  US of right axilla not performed  Left breast clear  7/16/2020 Genetic Testing     Genetic testing done - [facility]  The following genes were evaluated: LAURA, BRCA1, BRCA2, CDH1, CHEK2, PALB2, PTEN, STK11, TP53  Negative result  No pathogenic sequence variants or deletions/dupllications identified           Interval History:   The patient's genetic testing was negative  She is that with plastic surgery and has opted for a mastectomy        Review of Systems:   Review of Systems   Constitutional: Negative for activity change, appetite change and fatigue  HENT: Negative  Eyes: Negative  Respiratory: Negative for cough, shortness of breath and wheezing  Cardiovascular: Negative for chest pain and leg swelling  Gastrointestinal: Negative  Endocrine: Negative  Genitourinary: Negative  Musculoskeletal:        No new changes or complaints of bone pain   Skin: Negative  Allergic/Immunologic: Negative  Neurological: Negative  Hematological: Negative  Psychiatric/Behavioral: Negative  Past Medical History     Patient Active Problem List   Diagnosis    Ductal carcinoma in situ (DCIS) of right breast     No past medical history on file    Past Surgical History:   Procedure Laterality Date    MAMMO STEREOTACTIC BREAST BIOPSY RIGHT (ALL INC) Right 7/1/2020     Family History   Problem Relation Age of Onset    Endometrial cancer Mother 35    Cancer Mother 48        Bladder    No Known Problems Father     No Known Problems Sister     No Known Problems Daughter     No Known Problems Maternal Grandmother     No Known Problems Maternal Grandfather     No Known Problems Paternal Grandmother     No Known Problems Paternal Grandfather     No Known Problems Maternal Aunt     No Known Problems Maternal Aunt     No Known Problems Paternal Aunt     No Known Problems Paternal Aunt     No Known Problems Paternal Aunt      Social History     Socioeconomic History    Marital status: Single     Spouse name: Not on file    Number of children: Not on file    Years of education: Not on file    Highest education level: Not on file   Occupational History    Not on file   Social Needs    Financial resource strain: Not on file    Food insecurity:     Worry: Not on file     Inability: Not on file    Transportation needs:     Medical: Not on file     Non-medical: Not on file   Tobacco Use    Smoking status: Former Smoker     Types: Cigarettes     Last attempt to quit: 1993     Years since quittin 5    Smokeless tobacco: Never Used   Substance and Sexual Activity    Alcohol use: Not Currently    Drug use: Not on file    Sexual activity: Not on file   Lifestyle    Physical activity:     Days per week: Not on file     Minutes per session: Not on file    Stress: Not on file   Relationships    Social connections:     Talks on phone: Not on file     Gets together: Not on file     Attends Nondenominational service: Not on file     Active member of club or organization: Not on file     Attends meetings of clubs or organizations: Not on file     Relationship status: Not on file    Intimate partner violence:     Fear of current or ex partner: Not on file     Emotionally abused: Not on file     Physically abused: Not on file     Forced sexual activity: Not on file   Other Topics Concern    Not on file   Social History Narrative    Not on file       Current Outpatient Medications:     CALCIUM PO, Take by mouth, Disp: , Rfl:     Cholecalciferol (VITAMIN D3) 125 MCG (5000 UT) TABS, Take 5,000 Units by mouth daily, Disp: , Rfl:     Cyanocobalamin (B-12 PO), Take by mouth, Disp: , Rfl:     Menaquinone-7 (VITAMIN K2 PO), Take by mouth daily, Disp: , Rfl:     NON FORMULARY, Take by mouth daily Glucose Support, Disp: , Rfl:     Omega-3 Fatty Acids (FISH OIL PO), Take by mouth, Disp: , Rfl:     TURMERIC CURCUMIN PO, Take by mouth, Disp: , Rfl:   Allergies   Allergen Reactions    Codeine Vomiting    Penicillins Hives       Physical Exam:     Vitals:    20 1319   BP: 138/80   Pulse: 105   Resp: 16   Temp: 97 6 °F (36 4 °C)     Physical Exam   Constitutional: She is oriented to person, place, and time  She appears well-developed and well-nourished  HENT:   Head: Normocephalic and atraumatic  Mouth/Throat: Oropharynx is clear and moist    Eyes: Pupils are equal, round, and reactive to light  EOM are normal    Neck: Normal range of motion  Neck supple  No JVD present  No tracheal deviation present  No thyromegaly present  Cardiovascular: Normal rate, regular rhythm, normal heart sounds and intact distal pulses  Exam reveals no gallop and no friction rub  No murmur heard  Pulmonary/Chest: Effort normal and breath sounds normal  No respiratory distress  She has no wheezes  She has no rales  Both breasts were examined in the sitting and supine position  There are no worrisome skin lesions, no nipple retraction and no nipple discharge  There are no dominant masses, axillary adenopathy or supraclavicular adenopathy on either side  Abdominal: Soft  She exhibits no distension and no mass  There is no hepatomegaly  There is no tenderness  There is no rebound and no guarding  Musculoskeletal: Normal range of motion  She exhibits no edema or tenderness  Lymphadenopathy:     She has no cervical adenopathy  Neurological: She is alert and oriented to person, place, and time  No cranial nerve deficit  Skin: Skin is warm and dry  No rash noted  No erythema  Psychiatric: She has a normal mood and affect  Her behavior is normal    Vitals reviewed  Results & Discussion:   The patient had questions regarding a bilateral mastectomy  I explained that her chances of having a cancer on the contralateral side is quite low  I further explained that following her diagnosis the radiologist had read reviewed her films and see no suspicious findings on the contralateral left side  The patient was reassured and prefers at this point a right mastectomy  We went through the process of informed consent for right mastectomy in conjunction with a sentinel lymph node biopsy and possible axillary dissection  I reviewed the complications as outlined on the consent form ranging from minor skin irritations to major cardiac events liver failure renal failure stroke and death  Also reviewed the complications of breast recurrence postmastectomy pain syndromes as well as lymphedema    All questions were answered the patient's satisfaction  We will coordinate the surgery at our next mutual [facility]  Advance Care Planning/Advance Directives:  I discussed the disease status, treatment plans and follow-up with the patient

## 2020-07-29 NOTE — PATIENT INSTRUCTIONS
Pre-Surgery Instructions:   Medication Instructions    CALCIUM PO Stop taking 1 week prior to surgery    Cholecalciferol (VITAMIN D3) 125 MCG (5000 UT) TABS Stop taking 1 week prior to surgery    Cyanocobalamin (B-12 PO) Stop taking 1 week prior to surgery    Menaquinone-7 (VITAMIN K2 PO) Stop taking 1 week prior to surgery    NON FORMULARY Stop taking 1 week prior to surgery    Omega-3 Fatty Acids (FISH OIL PO) Stop taking 1 week prior to surgery    TURMERIC CURCUMIN PO Stop taking 1 week prior to surgery     Please stop taking all supplements and vitamins one week prior to your surgery  Mastectomy   AMBULATORY CARE:   What you need to know about a mastectomy:  A mastectomy is surgery to remove all or part of your breast  Tissue, lymph nodes, or muscle near the breast may also be removed  A mastectomy is done to treat breast cancer and prevent cancer from spreading  A mastectomy can also be done to prevent breast cancer  This may be a choice if you are at high risk for breast cancer  The type of mastectomy you need may depend on the size of the tumor  It may also depend on if the cancer has spread  How to prepare for a mastectomy:  Your healthcare provider will talk to you about how to prepare for surgery  He may tell you not to eat or drink anything after midnight on the day of your surgery  He will tell you what medicines to take or not take on the day of your surgery  You may need to stop taking aspirin or blood thinners several days before surgery  Arrange for someone to drive you home and stay with you after surgery  This person can help care for you and watch for complications from surgery  What will happen during a mastectomy:   · You will be given general anesthesia to keep you asleep and free from pain during surgery  You may be given an antibiotic through your IV to help prevent a bacterial infection   Your healthcare provider will make an incision over your breast  He will remove the tumor and breast tissue  He may also remove muscle from behind your breast  If lymph nodes will be taken, a small incision will be made in your armpit  The lymph nodes will be removed and tested for cancer  · One or more drains may be inserted near your incision  A drain removes extra fluid and helps your incision heal  Your healthcare provider will close your incision with stitches and cover it with a bandage  He may also wrap a tight-fitting bandage around both of your breasts  This may decrease swelling, bleeding, and pain  What will happen after a mastectomy:  Healthcare providers will monitor you until you are awake  You may need to spend 1 to 2 nights in the hospital  You may have difficulty moving your arm closest to your mastectomy  This should get better in a few days  Get out of bed and walk when your healthcare provider says it is safe  This will help prevent blood clots  Risks of a mastectomy:  You may bleed more than expected or get an infection  Nerves, blood vessels, and muscles may be damaged during your surgery  Blood or fluid may collect under your skin  You may need other procedures to remove the fluid or blood  You may have swelling in your arm closest to the mastectomy or where lymph nodes were removed  This swelling is called lymphedema  Lymphedema may cause tingling, numbness, stiffness, and weakness in your arm  This may be permanent  You may get a blood clot in your arm or leg  The blood clot may travel to your heart, lungs, or brain  This may become life-threatening  Call 911 for any of the following:   · You feel lightheaded, short of breath, and have chest pain  · You cough up blood  · You have trouble breathing  Seek care immediately if:   · Blood soaks through your bandage  · Your stitches come apart  · Your bruise suddenly gets bigger  · Your leg or arm is larger than normal and painful    Contact your healthcare provider if:   · You have a fever or chills  · Your wound is red, swollen, or draining pus  · You have nausea or are vomiting  · Your skin is itchy, swollen, or you have a rash  · Your pain does not get better after you take pain medicine  · Your drain falls out or stops draining fluid  · Your drain has pus or foul-smelling fluid coming out of it  · You have numbness, tingling, or swelling in your arm or hand  · You feel very sad or anxious  · You have trouble coping with your condition  · You have questions or concerns about your condition or care  Medicines: You may need any of the following:  · Antibiotics  help prevent a bacterial infection  · Prescription pain medicine  may be given  Ask your healthcare provider how to take this medicine safely  Some prescription pain medicines contain acetaminophen  Do not take other medicines that contain acetaminophen without talking to your healthcare provider  Too much acetaminophen may cause liver damage  Prescription pain medicine may cause constipation  Ask your healthcare provider how to prevent or treat constipation  · NSAIDs , such as ibuprofen, help decrease swelling, pain, and fever  NSAIDs can cause stomach bleeding or kidney problems in certain people  If you take blood thinner medicine, always ask your healthcare provider if NSAIDs are safe for you  Always read the medicine label and follow directions  · Take your medicine as directed  Contact your healthcare provider if you think your medicine is not helping or if you have side effects  Tell him or her if you are allergic to any medicine  Keep a list of the medicines, vitamins, and herbs you take  Include the amounts, and when and why you take them  Bring the list or the pill bottles to follow-up visits  Carry your medicine list with you in case of an emergency  Care for your wound as directed: If you have a tight-fitting bandage, you can remove it in 24 to 48 hours, or as directed   Ask your healthcare provider when your incision can get wet  You may need to take a sponge bath until your drain is removed  Carefully wash around the incision with soap and water  It is okay to allow the soap and water to gently run over your incision  Gently pat dry the area and put on new, clean bandages as directed  Change your bandages when they get wet or dirty  If lymph nodes were removed from your armpit, ask your healthcare provider when you can wear deodorant  Check your incision every day for redness, pus, or swelling  Self-care:   · Apply ice  on your incision for 15 to 20 minutes every hour or as directed  Use an ice pack, or put crushed ice in a plastic bag  Cover it with a towel  Ice helps prevent tissue damage and decreases swelling and pain  · Elevate  your arm nearest to your incision above the level of your heart  Do this as often as you can  This will help decrease swelling and pain  Prop your arm on pillows or blankets to keep it elevated comfortably  · Rest  as directed  Do not lift anything heavier than 5 pounds  Do not push or pull with your arms  You can use your arms to groom, eat, and bathe  Take short walks around the house  Gradually walk further as you feel better  Ask your healthcare provider when you can return to your normal activities  · Do not sleep on your stomach  This will put too much pressure on your incision  Sleep on your back or on the side opposite to your incision  · Empty your drain  as directed  You may need to write down how much fluid you empty from your drain each day  Ask your healthcare provider for more information about how to empty your drain  · Wear a supportive bra  as directed  Wait until you remove the tight-fitting bandage to wear a bra  You may be given a surgical bra or told to wear a sports bra  A supportive bra may help hold your bandages in place  It may also help with swelling and pain  Do not  wear bras with lace or underwire   They may rub against your incision and cause discomfort  Arm stretches: Your healthcare provider may show you how to do arm stretches  Arm stretches may prevent stiff arms or shoulders  You may need to wait until after your drains are removed to begin stretching  Do not do arm stretches until your healthcare provider says it is okay  Ask your healthcare provider for more information about arm stretches  More information and support: You may have difficulty coping with the changes to your body  Talk to your family or friends about how you are feeling  Ask your healthcare provider about support groups  It may be helpful to talk with other women who have had a mastectomy  · [address]  [place] [address]  Phone: [phone]  Web Address: [email or web address]  [facility]  · [address] [place] [address]  Phone: [phone]  Web Address: http://ebindle/  gov  Follow up with your healthcare provider as directed:  Write down your questions so you remember to ask them during your visits  © 2017 [address] [place] [address] Information is for End User's use only and may not be sold, redistributed or otherwise used for commercial purposes  All illustrations and images included in CareNotes® are the copyrighted property of [employer] , [employer]  or Hal Rubalcava  The above information is an  only  It is not intended as medical advice for individual conditions or treatments  Talk to your doctor, nurse or pharmacist before following any medical regimen to see if it is safe and effective for you  Breast Cancer [place] Lymph Node Biopsy   AMBULATORY CARE:   What you need to know about a sentinel lymph node biopsy (SLNB):  A sentinel lymph node (SLN) is usually the lymph node closest to the breast tumor  It is usually found in the armpit, or along the sternum (breastbone) or collarbone   A biopsy is a procedure used to find and remove a SLN  During the biopsy, the SLN will be tested for cancer cells  If the test is positive, it may mean that breast cancer has spread outside of your breast  This information can help your healthcare provider decide what other treatments you need  How to prepare for a SLNB:   · You may need a nuclear scan before your procedure  During a nuclear scan, healthcare providers will inject a small amount of radioactive liquid in your breast  Radioactive liquid will move to the location of your lymph nodes and help them show up better in pictures  A camera will take pictures of the lymph nodes  The pictures will help your healthcare provider plan for your procedure  · Your healthcare provider will talk to you about how to prepare for your procedure  He may tell you not to eat or drink anything after midnight on the day of your procedure  He will tell you what medicines to take or not take on the day of your procedure  You may be given contrast liquid during your biopsy  Tell your healthcare provider if you have ever had an allergic reaction to contrast liquid  Arrange for someone to drive you home and stay with you after your procedure  What will happen during a SLNB:   · You may be given an antibiotic through your IV to help prevent a bacterial infection  Tell the healthcare provider if you have ever had an allergic reaction to an antibiotic  You may be given general anesthesia to keep you asleep and free from pain during your procedure  You may instead be given local anesthesia to numb the area  With local anesthesia, you may still feel pressure or pushing during the procedure, but you should not feel any pain  · Your healthcare provider will inject blue contrast liquid, radioactive liquid, or both near the tumor  The liquid will move to the SLN  Your healthcare provider may use an instrument to help find the SLN  He will do this by gently moving an instrument over your skin   The instrument will show pictures of the SLN on a monitor  Your healthcare provider will make a small incision in the skin that covers the SLN  The incision is usually in your armpit or chest  The SLN will be removed and checked for cancer cells  If cancer is found, your healthcare provider may remove several more lymph nodes for testing  Your incision may be closed with stitches or strips of medical tape and covered with a bandage  What will happen after a SLNB:  Healthcare providers will monitor you until you are awake  You may be able to go home after you are awake and your pain is controlled  Your urine or bowel movement may be blue for 24 to 48 hours after your procedure  This is caused by the blue contrast liquid given to you during the procedure  You may have bruising or swelling at the biopsy site  This is normal and expected  The arm closest to the biopsy site may be sore  This should get better within 48 to 72 hours  Risks of a SLNB:  You may bleed more than expected or get an infection  You may develop a condition called lymphedema  Lymphedema is tissue swelling in your arm nearest to where the SLN was removed  You may have long-term pain or discomfort in your arm  Your skin in the arm may be permanently thick or hard  Your nerves may be damaged during your procedure  This may cause numbness or tingling in your arm  It may also cause difficulty moving your arm  You may have an allergic reaction to the contrast liquid  This may require medicine or other treatments  Seek care immediately if:   · Blood soaks through your bandage  · Your stitches come apart  · Your bruise suddenly gets larger and feels firm  Contact your healthcare provider if:   · You have a fever or chills  · Your wound is red, swollen, or draining pus  · You have nausea or are vomiting  · Your skin is itchy, swollen, or you have a rash  · Your pain does not get better after you take medicine for pain       · You have questions or concerns about your condition or care  Medicines: You may need any of the following:  · NSAIDs , such as ibuprofen, help decrease swelling, pain, and fever  This medicine is available with or without a doctor's order  NSAIDs can cause stomach bleeding or kidney problems in certain people  If you take blood thinner medicine, always ask your healthcare provider if NSAIDs are safe for you  Always read the medicine label and follow directions  · Acetaminophen  decreases pain and fever  It is available without a doctor's order  Ask how much to take and how often to take it  Follow directions  Read the labels of all other medicines you are using to see if they also contain acetaminophen, or ask your doctor or pharmacist  Acetaminophen can cause liver damage if not taken correctly  Do not use more than 4 grams (4,000 milligrams) total of acetaminophen in one day  · Prescription pain medicine  may be given  Ask your healthcare provider how to take this medicine safely  Some prescription pain medicines contain acetaminophen  Do not take other medicines that contain acetaminophen without talking to your healthcare provider  Too much acetaminophen may cause liver damage  Prescription pain medicine may cause constipation  Ask your healthcare provider how to prevent or treat constipation  · Take your medicine as directed  Contact your healthcare provider if you think your medicine is not helping or if you have side effects  Tell him or her if you are allergic to any medicine  Keep a list of the medicines, vitamins, and herbs you take  Include the amounts, and when and why you take them  Bring the list or the pill bottles to follow-up visits  Carry your medicine list with you in case of an emergency  Care for your incision wound as directed:  Ask your healthcare provider when your wound can get wet  Carefully wash around the wound with soap and water   It is okay to let soap and water gently run over your wound  Do not  scrub your wound  Gently pat dry the area and put on new, clean bandages as directed  Change your bandages when they get wet or dirty  If you have strips of medical tape, let them fall of on their own  It may take 10 to 14 days for them to fall off  Check your wound every day for signs of infection, such as redness, swelling, or pus  Do not put powders or lotions on your wound  If lymph nodes have been taken from your armpit, ask your healthcare provider when you can wear deodorant  Self-care:   · Apply ice  on your wound for 15 to 20 minutes every hour or as directed  Use an ice pack, or put crushed ice in a plastic bag  Cover it with a towel before you apply it to your skin  Ice helps prevent tissue damage and decreases swelling and pain  · Elevate  your arm nearest to the biopsy site as often as you can  This will help decrease swelling and pain  Prop your arm on pillows or blankets to keep it elevated above the level of your heart comfortably  · Do not do strenuous activities  for 24 to 48 hours  Strenuous activities include heavy lifting, sports, or running  If lymph nodes were taken from your armpit, do not push or pull with your arm  These activities may put too much stress on your wound  Rest and take short walks around the house  Ask your healthcare provider when you can return to your normal activities  · Drink plenty of liquids  as directed  This will help flush out contrast liquid from your body  Ask how much liquid to drink each day and which liquids are best for you  Ask your healthcare provider how to prevent lymphedema and infection:  Lymphedema is fluid buildup in fatty tissues under your skin  Lymphedema may happen in the arm closest to where lymph nodes were removed  An infection in your skin can make lymphedema worse  Ask your healthcare provider how you can decrease your risk for skin infections and lymphedema     Follow up with your healthcare provider as directed:  Write down your questions so you remember to ask them during your visits  © 2017 [address] Information is for End User's use only and may not be sold, redistributed or otherwise used for commercial purposes  All illustrations and images included in CareNotes® are the copyrighted property of [employer]  or Hal Rubalcava  The above information is an  only  It is not intended as medical advice for individual conditions or treatments  Talk to your doctor, nurse or pharmacist before following any medical regimen to see if it is safe and effective for you  Niko-Gutierrez Drain Care   AMBULATORY CARE:   A Niko-Gutierrez (MAGGIE) drain  is used to remove fluids that build up in an area of your body after surgery  The MAGGIE drain is a bulb shaped device connected to a tube  One end of the tube is placed inside you during surgery  The other end comes out through a small cut in your skin  The bulb is connected to this end  You may have a stitch to hold the tube in place  Seek care immediately if:   · Your MAGGIE drain breaks or comes out  · You have cloudy yellow or brown drainage from your MAGGIE drain site, or the drainage smells bad  Contact your healthcare provider if:   · You drain less than 30 milliliters (2 tablespoons) in 24 hours  This may mean your drain can be removed  · You suddenly stop draining fluid or think your MAGGIE drain is blocked  · You have a fever higher than 101 5°F (38 6°C)  · You have increased pain, redness, or swelling around the drain site  · You have questions about your MAGGIE drain care  How a Niko-Gutierrez drain works: The MAGGIE drain removes fluids by creating suction in the tube  The bulb is squeezed flat and connected to the tube that sticks out of your body  The bulb expands as it fills with fluid  How to change the bandage around your Niko-Gutierrez drain:  If you have a bandage, change it once a day   You may need to change your bandage more than once a day if it gets completely wet  · Wash your hands with soap and water  · Loosen the tape and gently remove the old bandage  Throw the old bandage into a plastic trash bag  · Use soap and water or saline (salt water) solution to clean your MAGGIE drain site  Dip a cotton swab or gauze pad in the solution and gently clean your skin  · Pat the area dry  · Place a new bandage on your MAGGIE drain site and secure it to your skin with medical tape  · Wash your hands  How to empty the Niko-Gutierrez drain:  Empty the bulb when it is half full or every 8 to 12 hours  · Wash your hands with soap and water  · Remove the plug from the bulb  · Pour the fluid into a measuring cup  · Clean the plug with an alcohol swab or a cotton ball dipped in rubbing alcohol  · Squeeze the bulb flat and put the plug back in  The bulb should stay flat until it starts to fill with fluid again  · Measure the amount of fluid you pour out  Write down how much fluid you empty from the MAGGIE drain and the date and time you collected it  · Flush the fluid down the toilet  Wash your hands  Clear clogged tubing: Use the following steps to clear your Niko-Gutierrez tubing:  · Hold the tubing between your thumb and first finger at the place closest to your skin  This hand will prevent the tube from being pulled out of your skin  · Use your other thumb and first finger to slide the clog down the tubing toward the bulb  You may have to repeat the sliding until the tubing is unclogged  Niko-Gutierrez drain removal:  The amount of fluid that you drain will decrease as your wound heals  The MAGGIE drain usually is removed when less than 30 milliliters (2 tablespoons) is collected in 24 hours  Ask your healthcare provider when and how your MAGGIE drain will be removed  Follow up with your healthcare provider as directed:  Write down your questions so you remember to ask them during your visits  © 2017 [address] [facility] Information is for End User's use only and may not be sold, redistributed or otherwise used for commercial purposes  All illustrations and images included in CareNotes® are the copyrighted property of [employer]  or Hal Rubalcava  The above information is an  only  It is not intended as medical advice for individual conditions or treatments  Talk to your doctor, nurse or pharmacist before following any medical regimen to see if it is safe and effective for you

## 2020-08-17 ENCOUNTER — TELEPHONE (OUTPATIENT)
Dept: SURGICAL ONCOLOGY | Facility: CLINIC | Age: 65
End: 2020-08-17

## 2020-08-17 DIAGNOSIS — D05.11 DUCTAL CARCINOMA IN SITU (DCIS) OF RIGHT BREAST: ICD-10-CM

## 2020-08-17 PROCEDURE — U0003 INFECTIOUS AGENT DETECTION BY NUCLEIC ACID (DNA OR RNA); SEVERE ACUTE RESPIRATORY SYNDROME CORONAVIRUS 2 (SARS-COV-2) (CORONAVIRUS DISEASE [COVID-19]), AMPLIFIED PROBE TECHNIQUE, MAKING USE OF HIGH THROUGHPUT TECHNOLOGIES AS DESCRIBED BY CMS-2020-01-R: HCPCS | Performed by: SURGERY

## 2020-08-17 NOTE — TELEPHONE ENCOUNTER
Patient called the office asking about COVID testing before her surgery   I double check with Alfa Wu and advise the patient that she needs to get he test today  Patient verbalized understanding

## 2020-08-18 LAB — SARS-COV-2 RNA SPEC QL NAA+PROBE: NOT DETECTED

## 2020-08-19 ENCOUNTER — TELEPHONE (OUTPATIENT)
Dept: SURGICAL ONCOLOGY | Facility: CLINIC | Age: 65
End: 2020-08-19

## 2020-08-19 NOTE — TELEPHONE ENCOUNTER
Patient called the office with new concerns of left breast tenderness  Patient reports that she noticed it recently and denies any skin changes, swelling, or redness in the area  Patient voiced concern and fears related to her right breast cancer  Reviewed with Dr Fawn Soliman who was comfortable having the patient proceed with surgery as scheduled next week  Explained to patient that it was not uncommon for patients to complain of pain or sensations in the contralateral breast after being diagnosed with a breast cancer  Informed her that we could proceed with her surgery next week without needing to be seen in the office and if the left breast was  at her post-op appointment, Dr Fawn Soliman would do an exam  Patient verbalized understanding

## 2020-08-25 NOTE — PRE-PROCEDURE INSTRUCTIONS
Pre-Surgery Instructions:   Medication Instructions    CALCIUM PO Instructed patient per Anesthesia Guidelines   Cholecalciferol (VITAMIN D3) 125 MCG (5000 UT) TABS Instructed patient per Anesthesia Guidelines   Cyanocobalamin (B-12 PO) Instructed patient per Anesthesia Guidelines   Menaquinone-7 (VITAMIN K2 PO) Instructed patient per Anesthesia Guidelines   NON FORMULARY Instructed patient per Anesthesia Guidelines  Preop and bathing instructions reviewed  Pt has Hibiclens

## 2020-08-26 ENCOUNTER — ANESTHESIA EVENT (OUTPATIENT)
Dept: PERIOP | Facility: HOSPITAL | Age: 65
End: 2020-08-26
Payer: COMMERCIAL

## 2020-08-26 NOTE — ANESTHESIA PREPROCEDURE EVALUATION
Procedure:  BREAST MASTECTOMY WITH SENTINEL LYMPH NODE BIOPSY, LYMPHATIC MAPPING WITH BLUE DYE AND RADIOACTIVE DYE (INJECT AT 0800 BY DR BRAVO IN THE OR) (Right Breast)  BREAST INSERTION/PLACEMENT TISSUE EXPANDER (EXCHANGE) (Right Breast)  BREAST RECONSTRUCTION WITH IMPLANT (Right Breast)    Relevant Problems   ANESTHESIA (within normal limits)      CARDIO (within normal limits)      ENDO (within normal limits)      GI/HEPATIC (within normal limits)      /RENAL (within normal limits)      GYN (within normal limits)      HEMATOLOGY (within normal limits)      MUSCULOSKELETAL (within normal limits)      NEURO/PSYCH (within normal limits)      PULMONARY (within normal limits)      DCIS s/p masectomy  Physical Exam    Airway    Mallampati score: II  TM Distance: >3 FB  Neck ROM: full     Dental   Comment: Denies any loose ,     Cardiovascular      Pulmonary      Other Findings        Anesthesia Plan  ASA Score- 2     Anesthesia Type- general and regional with ASA Monitors  Additional Monitors:   Airway Plan: ETT  Comment: PEC blocks for postoperative pain control   Plan Factors-    Induction- intravenous  Postoperative Plan- Plan for postoperative opioid use  Planned trial extubation    Informed Consent- Anesthetic plan and risks discussed with patient  I personally reviewed this patient with the CRNA  Discussed and agreed on the Anesthesia Plan with the CRNA  Mulugeta Astudillo

## 2020-08-27 ENCOUNTER — HOSPITAL ENCOUNTER (OUTPATIENT)
Facility: HOSPITAL | Age: 65
Setting detail: OUTPATIENT SURGERY
Discharge: HOME HEALTH | End: 2020-08-28
Attending: SURGERY | Admitting: SURGERY
Payer: COMMERCIAL

## 2020-08-27 ENCOUNTER — HOSPITAL ENCOUNTER (OUTPATIENT)
Dept: NUCLEAR MEDICINE | Facility: HOSPITAL | Age: 65
Discharge: HOME | End: 2020-08-27
Attending: SURGERY
Payer: COMMERCIAL

## 2020-08-27 ENCOUNTER — ANESTHESIA (OUTPATIENT)
Dept: PERIOP | Facility: HOSPITAL | Age: 65
End: 2020-08-27
Payer: COMMERCIAL

## 2020-08-27 VITALS
HEART RATE: 86 BPM | TEMPERATURE: 97.5 F | RESPIRATION RATE: 20 BRPM | SYSTOLIC BLOOD PRESSURE: 131 MMHG | OXYGEN SATURATION: 100 % | DIASTOLIC BLOOD PRESSURE: 65 MMHG

## 2020-08-27 DIAGNOSIS — D05.11 DUCTAL CARCINOMA IN SITU (DCIS) OF RIGHT BREAST: ICD-10-CM

## 2020-08-27 LAB
ANION GAP SERPL CALCULATED.3IONS-SCNC: 8 MMOL/L (ref 4–13)
BUN SERPL-MCNC: 14 MG/DL (ref 5–25)
CALCIUM SERPL-MCNC: 9.1 MG/DL (ref 8.3–10.1)
CHLORIDE SERPL-SCNC: 102 MMOL/L (ref 100–108)
CO2 SERPL-SCNC: 24 MMOL/L (ref 21–32)
CREAT SERPL-MCNC: 0.93 MG/DL (ref 0.6–1.3)
GFR SERPL CREATININE-BSD FRML MDRD: 64 ML/MIN/1.73SQ M
GLUCOSE SERPL-MCNC: 150 MG/DL (ref 65–140)
POTASSIUM SERPL-SCNC: 4.5 MMOL/L (ref 3.5–5.3)
SODIUM SERPL-SCNC: 134 MMOL/L (ref 136–145)

## 2020-08-27 PROCEDURE — 99024 POSTOP FOLLOW-UP VISIT: CPT | Performed by: PHYSICIAN ASSISTANT

## 2020-08-27 PROCEDURE — 38792 RA TRACER ID OF SENTINL NODE: CPT

## 2020-08-27 PROCEDURE — 19307 MAST MOD RAD: CPT | Performed by: SURGERY

## 2020-08-27 PROCEDURE — 80048 BASIC METABOLIC PNL TOTAL CA: CPT | Performed by: PHYSICIAN ASSISTANT

## 2020-08-27 PROCEDURE — 38792 RA TRACER ID OF SENTINL NODE: CPT | Performed by: SURGERY

## 2020-08-27 PROCEDURE — 88333 PATH CONSLTJ SURG CYTO XM 1: CPT | Performed by: PATHOLOGY

## 2020-08-27 PROCEDURE — 88307 TISSUE EXAM BY PATHOLOGIST: CPT | Performed by: PATHOLOGY

## 2020-08-27 PROCEDURE — C9290 INJ, BUPIVACAINE LIPOSOME: HCPCS | Performed by: STUDENT IN AN ORGANIZED HEALTH CARE EDUCATION/TRAINING PROGRAM

## 2020-08-27 PROCEDURE — 94664 DEMO&/EVAL PT USE INHALER: CPT

## 2020-08-27 PROCEDURE — C1789 PROSTHESIS, BREAST, IMP: HCPCS | Performed by: SURGERY

## 2020-08-27 PROCEDURE — A9541 TC99M SULFUR COLLOID: HCPCS

## 2020-08-27 PROCEDURE — 38900 IO MAP OF SENT LYMPH NODE: CPT | Performed by: SURGERY

## 2020-08-27 DEVICE — SMOOTH, HIGH PROFILE, SUTURE TABS, INTEGRAL INJECTION DOME, 500CC
Type: IMPLANTABLE DEVICE | Site: BREAST | Status: FUNCTIONAL
Brand: ARTOURA BREAST TISSUE EXPANDER

## 2020-08-27 DEVICE — IMPLANTABLE DEVICE: Type: IMPLANTABLE DEVICE | Site: BREAST | Status: FUNCTIONAL

## 2020-08-27 RX ORDER — BUPIVACAINE HYDROCHLORIDE 5 MG/ML
INJECTION, SOLUTION PERINEURAL
Status: DISCONTINUED | OUTPATIENT
Start: 2020-08-27 | End: 2020-08-27

## 2020-08-27 RX ORDER — FENTANYL CITRATE/PF 50 MCG/ML
50 SYRINGE (ML) INJECTION
Status: DISCONTINUED | OUTPATIENT
Start: 2020-08-27 | End: 2020-08-27 | Stop reason: HOSPADM

## 2020-08-27 RX ORDER — ONDANSETRON 2 MG/ML
4 INJECTION INTRAMUSCULAR; INTRAVENOUS ONCE AS NEEDED
Status: DISCONTINUED | OUTPATIENT
Start: 2020-08-27 | End: 2020-08-27 | Stop reason: HOSPADM

## 2020-08-27 RX ORDER — DOCUSATE SODIUM 100 MG/1
100 CAPSULE, LIQUID FILLED ORAL 2 TIMES DAILY
Status: DISCONTINUED | OUTPATIENT
Start: 2020-08-27 | End: 2020-08-28 | Stop reason: HOSPADM

## 2020-08-27 RX ORDER — CLINDAMYCIN PHOSPHATE 900 MG/50ML
INJECTION INTRAVENOUS AS NEEDED
Status: DISCONTINUED | OUTPATIENT
Start: 2020-08-27 | End: 2020-08-27

## 2020-08-27 RX ORDER — ONDANSETRON 2 MG/ML
INJECTION INTRAMUSCULAR; INTRAVENOUS AS NEEDED
Status: DISCONTINUED | OUTPATIENT
Start: 2020-08-27 | End: 2020-08-27

## 2020-08-27 RX ORDER — SUCCINYLCHOLINE/SOD CL,ISO/PF 100 MG/5ML
SYRINGE (ML) INTRAVENOUS AS NEEDED
Status: DISCONTINUED | OUTPATIENT
Start: 2020-08-27 | End: 2020-08-27

## 2020-08-27 RX ORDER — DIPHENHYDRAMINE HCL 25 MG
50 TABLET ORAL EVERY 6 HOURS PRN
Status: DISCONTINUED | OUTPATIENT
Start: 2020-08-27 | End: 2020-08-28 | Stop reason: HOSPADM

## 2020-08-27 RX ORDER — PROPOFOL 10 MG/ML
INJECTION, EMULSION INTRAVENOUS AS NEEDED
Status: DISCONTINUED | OUTPATIENT
Start: 2020-08-27 | End: 2020-08-27

## 2020-08-27 RX ORDER — ONDANSETRON 2 MG/ML
4 INJECTION INTRAMUSCULAR; INTRAVENOUS EVERY 6 HOURS PRN
Status: DISCONTINUED | OUTPATIENT
Start: 2020-08-27 | End: 2020-08-28 | Stop reason: HOSPADM

## 2020-08-27 RX ORDER — SODIUM CHLORIDE, SODIUM LACTATE, POTASSIUM CHLORIDE, CALCIUM CHLORIDE 600; 310; 30; 20 MG/100ML; MG/100ML; MG/100ML; MG/100ML
50 INJECTION, SOLUTION INTRAVENOUS CONTINUOUS
Status: DISCONTINUED | OUTPATIENT
Start: 2020-08-27 | End: 2020-08-27

## 2020-08-27 RX ORDER — DOCUSATE SODIUM 100 MG/1
100 CAPSULE, LIQUID FILLED ORAL 2 TIMES DAILY
Status: DISCONTINUED | OUTPATIENT
Start: 2020-08-27 | End: 2020-08-27

## 2020-08-27 RX ORDER — VANCOMYCIN HYDROCHLORIDE 1 G/200ML
1000 INJECTION, SOLUTION INTRAVENOUS ONCE
Status: DISCONTINUED | OUTPATIENT
Start: 2020-08-27 | End: 2020-08-27 | Stop reason: HOSPADM

## 2020-08-27 RX ORDER — DEXAMETHASONE SODIUM PHOSPHATE 4 MG/ML
INJECTION, SOLUTION INTRA-ARTICULAR; INTRALESIONAL; INTRAMUSCULAR; INTRAVENOUS; SOFT TISSUE AS NEEDED
Status: DISCONTINUED | OUTPATIENT
Start: 2020-08-27 | End: 2020-08-27

## 2020-08-27 RX ORDER — MORPHINE SULFATE 4 MG/ML
3 INJECTION, SOLUTION INTRAMUSCULAR; INTRAVENOUS
Status: DISCONTINUED | OUTPATIENT
Start: 2020-08-27 | End: 2020-08-28 | Stop reason: HOSPADM

## 2020-08-27 RX ORDER — SODIUM CHLORIDE, SODIUM LACTATE, POTASSIUM CHLORIDE, CALCIUM CHLORIDE 600; 310; 30; 20 MG/100ML; MG/100ML; MG/100ML; MG/100ML
INJECTION, SOLUTION INTRAVENOUS CONTINUOUS PRN
Status: DISCONTINUED | OUTPATIENT
Start: 2020-08-27 | End: 2020-08-27

## 2020-08-27 RX ORDER — OXYCODONE HYDROCHLORIDE AND ACETAMINOPHEN 5; 325 MG/1; MG/1
2 TABLET ORAL EVERY 4 HOURS PRN
Status: DISCONTINUED | OUTPATIENT
Start: 2020-08-27 | End: 2020-08-28 | Stop reason: HOSPADM

## 2020-08-27 RX ORDER — VANCOMYCIN HYDROCHLORIDE 1 G/200ML
20 INJECTION, SOLUTION INTRAVENOUS ONCE
Status: COMPLETED | OUTPATIENT
Start: 2020-08-27 | End: 2020-08-27

## 2020-08-27 RX ORDER — CLINDAMYCIN PHOSPHATE 900 MG/50ML
900 INJECTION INTRAVENOUS ONCE
Status: DISCONTINUED | OUTPATIENT
Start: 2020-08-27 | End: 2020-08-28 | Stop reason: HOSPADM

## 2020-08-27 RX ORDER — HYDROMORPHONE HCL 110MG/55ML
PATIENT CONTROLLED ANALGESIA SYRINGE INTRAVENOUS AS NEEDED
Status: DISCONTINUED | OUTPATIENT
Start: 2020-08-27 | End: 2020-08-27

## 2020-08-27 RX ORDER — METOCLOPRAMIDE HYDROCHLORIDE 5 MG/ML
INJECTION INTRAMUSCULAR; INTRAVENOUS AS NEEDED
Status: DISCONTINUED | OUTPATIENT
Start: 2020-08-27 | End: 2020-08-27

## 2020-08-27 RX ORDER — ROCURONIUM BROMIDE 10 MG/ML
INJECTION, SOLUTION INTRAVENOUS AS NEEDED
Status: DISCONTINUED | OUTPATIENT
Start: 2020-08-27 | End: 2020-08-27

## 2020-08-27 RX ORDER — NEOSTIGMINE METHYLSULFATE 1 MG/ML
INJECTION INTRAVENOUS AS NEEDED
Status: DISCONTINUED | OUTPATIENT
Start: 2020-08-27 | End: 2020-08-27

## 2020-08-27 RX ORDER — LIDOCAINE HYDROCHLORIDE 10 MG/ML
INJECTION, SOLUTION EPIDURAL; INFILTRATION; INTRACAUDAL; PERINEURAL AS NEEDED
Status: DISCONTINUED | OUTPATIENT
Start: 2020-08-27 | End: 2020-08-27

## 2020-08-27 RX ORDER — MIDAZOLAM HYDROCHLORIDE 2 MG/2ML
INJECTION, SOLUTION INTRAMUSCULAR; INTRAVENOUS AS NEEDED
Status: DISCONTINUED | OUTPATIENT
Start: 2020-08-27 | End: 2020-08-27

## 2020-08-27 RX ORDER — FENTANYL CITRATE 50 UG/ML
INJECTION, SOLUTION INTRAMUSCULAR; INTRAVENOUS AS NEEDED
Status: DISCONTINUED | OUTPATIENT
Start: 2020-08-27 | End: 2020-08-27

## 2020-08-27 RX ORDER — GLYCOPYRROLATE 0.2 MG/ML
INJECTION INTRAMUSCULAR; INTRAVENOUS AS NEEDED
Status: DISCONTINUED | OUTPATIENT
Start: 2020-08-27 | End: 2020-08-27

## 2020-08-27 RX ORDER — VANCOMYCIN HYDROCHLORIDE 500 MG/100ML
10 INJECTION, SOLUTION INTRAVENOUS EVERY 12 HOURS
Status: DISCONTINUED | OUTPATIENT
Start: 2020-08-28 | End: 2020-08-28 | Stop reason: HOSPADM

## 2020-08-27 RX ORDER — SODIUM CHLORIDE, SODIUM LACTATE, POTASSIUM CHLORIDE, CALCIUM CHLORIDE 600; 310; 30; 20 MG/100ML; MG/100ML; MG/100ML; MG/100ML
100 INJECTION, SOLUTION INTRAVENOUS CONTINUOUS
Status: DISCONTINUED | OUTPATIENT
Start: 2020-08-27 | End: 2020-08-28 | Stop reason: HOSPADM

## 2020-08-27 RX ADMIN — VANCOMYCIN HYDROCHLORIDE 1000 MG: 1 INJECTION, SOLUTION INTRAVENOUS at 17:44

## 2020-08-27 RX ADMIN — ROCURONIUM BROMIDE 10 MG: 10 SOLUTION INTRAVENOUS at 09:43

## 2020-08-27 RX ADMIN — GLYCOPYRROLATE 0.5 MG: 0.2 INJECTION, SOLUTION INTRAMUSCULAR; INTRAVENOUS at 11:16

## 2020-08-27 RX ADMIN — OXYCODONE HYDROCHLORIDE AND ACETAMINOPHEN 1 TABLET: 5; 325 TABLET ORAL at 14:32

## 2020-08-27 RX ADMIN — BUPIVACAINE HYDROCHLORIDE 10 ML: 5 INJECTION, SOLUTION PERINEURAL at 08:15

## 2020-08-27 RX ADMIN — FENTANYL CITRATE 50 MCG: 50 INJECTION INTRAMUSCULAR; INTRAVENOUS at 08:36

## 2020-08-27 RX ADMIN — SODIUM CHLORIDE, SODIUM LACTATE, POTASSIUM CHLORIDE, AND CALCIUM CHLORIDE 100 ML/HR: .6; .31; .03; .02 INJECTION, SOLUTION INTRAVENOUS at 18:08

## 2020-08-27 RX ADMIN — ONDANSETRON 4 MG: 2 INJECTION INTRAMUSCULAR; INTRAVENOUS at 17:39

## 2020-08-27 RX ADMIN — ROCURONIUM BROMIDE 10 MG: 10 SOLUTION INTRAVENOUS at 09:55

## 2020-08-27 RX ADMIN — HYDROMORPHONE HYDROCHLORIDE 0.5 MG: 2 INJECTION INTRAMUSCULAR; INTRAVENOUS; SUBCUTANEOUS at 09:20

## 2020-08-27 RX ADMIN — MORPHINE SULFATE 2 MG: 4 INJECTION INTRAVENOUS at 21:41

## 2020-08-27 RX ADMIN — SODIUM CHLORIDE, SODIUM LACTATE, POTASSIUM CHLORIDE, AND CALCIUM CHLORIDE 50 ML/HR: .6; .31; .03; .02 INJECTION, SOLUTION INTRAVENOUS at 15:30

## 2020-08-27 RX ADMIN — HYDROMORPHONE HYDROCHLORIDE 0.5 MG: 2 INJECTION INTRAMUSCULAR; INTRAVENOUS; SUBCUTANEOUS at 09:03

## 2020-08-27 RX ADMIN — MIDAZOLAM HYDROCHLORIDE 2 MG: 1 INJECTION, SOLUTION INTRAMUSCULAR; INTRAVENOUS at 08:15

## 2020-08-27 RX ADMIN — PROPOFOL 150 MG: 10 INJECTION, EMULSION INTRAVENOUS at 08:36

## 2020-08-27 RX ADMIN — CLINDAMYCIN PHOSPHATE 900 MG: 18 INJECTION, SOLUTION INTRAMUSCULAR; INTRAVENOUS at 08:28

## 2020-08-27 RX ADMIN — METOCLOPRAMIDE HYDROCHLORIDE 5 MG: 5 INJECTION INTRAMUSCULAR; INTRAVENOUS at 08:55

## 2020-08-27 RX ADMIN — ONDANSETRON 4 MG: 2 INJECTION INTRAMUSCULAR; INTRAVENOUS at 10:45

## 2020-08-27 RX ADMIN — FENTANYL CITRATE 50 MCG: 50 INJECTION INTRAMUSCULAR; INTRAVENOUS at 08:17

## 2020-08-27 RX ADMIN — LIDOCAINE HYDROCHLORIDE 50 MG: 10 INJECTION, SOLUTION EPIDURAL; INFILTRATION; INTRACAUDAL; PERINEURAL at 08:36

## 2020-08-27 RX ADMIN — NEOSTIGMINE METHYLSULFATE 3 MG: 1 INJECTION INTRAVENOUS at 11:18

## 2020-08-27 RX ADMIN — DEXAMETHASONE SODIUM PHOSPHATE 4 MG: 4 INJECTION, SOLUTION INTRAMUSCULAR; INTRAVENOUS at 08:44

## 2020-08-27 RX ADMIN — OXYCODONE HYDROCHLORIDE AND ACETAMINOPHEN 1 TABLET: 5; 325 TABLET ORAL at 18:29

## 2020-08-27 RX ADMIN — OXYCODONE HYDROCHLORIDE AND ACETAMINOPHEN 1 TABLET: 5; 325 TABLET ORAL at 23:33

## 2020-08-27 RX ADMIN — Medication 100 MG: at 08:36

## 2020-08-27 RX ADMIN — SODIUM CHLORIDE, SODIUM LACTATE, POTASSIUM CHLORIDE, AND CALCIUM CHLORIDE: .6; .31; .03; .02 INJECTION, SOLUTION INTRAVENOUS at 08:31

## 2020-08-27 NOTE — RESPIRATORY THERAPY NOTE
RT Protocol Note  Yariel Chavez 77 y o  female MRN: [id number]  Unit/Bed#: S -01 Encounter: [id number]    Assessment    Active Problems:    * No active hospital problems  *      Home Pulmonary Medications:  none  Home Devices/Therapy: (P) (none)    Past Medical History:   Diagnosis Date    [facility]     Social History     Socioeconomic History    Marital status: Single     Spouse name: None    Number of children: None    Years of education: None    Highest education level: None   Occupational History    None   Social Needs    Financial resource strain: None    Food insecurity     Worry: None     Inability: None    Transportation needs     Medical: None     Non-medical: None   Tobacco Use    Smoking status: Never Smoker    Smokeless tobacco: Never Used   Substance and Sexual Activity    Alcohol use: Not Currently    Drug use: Never    Sexual activity: None   Lifestyle    Physical activity     Days per week: None     Minutes per session: None    Stress: None   Relationships    Social connections     Talks on phone: None     Gets together: None     Attends [employer] service: None     Active member of club or organization: None     Attends meetings of clubs or organizations: None     Relationship status: None    Intimate partner violence     Fear of current or ex partner: None     Emotionally abused: None     Physically abused: None     Forced sexual activity: None   Other Topics Concern    None   Social History Narrative    None       Subjective         Objective    Physical Exam:   Assessment Type: (P) Assess only  General Appearance: (P) Awake, Drowsy  Respiratory Pattern: (P) Normal  Bilateral Breath Sounds: (P) Clear, Diminished    Vitals:  Blood pressure 123/72, pulse 98, temperature 97 5 °F (36 4 °C), temperature source Oral, resp  rate 16, height 4' 10" (1 473 m), weight 55 3 kg (122 lb), SpO2 97 %            Imaging and other studies: I have personally reviewed pertinent reports  Plan    Respiratory Plan: (P) Discontinue Protocol        Resp Comments: (P) s/p mastectomy  IS at bedside and pt encouraged to use Q1 10times  No pulm hx

## 2020-08-27 NOTE — OP NOTE
OPERATIVE REPORT  PATIENT NAME: Sydnie Yanes    :  1955  MRN: [id number]  Pt Location: AN OR ROOM 03    SURGERY DATE: 2020    Surgeon(s) and Role:  Panel 1:     * Joellen Price MD - Primary     * Gorman Primrose How, MD - Assisting  Panel 2:     * Jacob Merlos MD - Primary    Preop Diagnosis:  Ductal carcinoma in situ (DCIS) of right breast [D05 11]    Post-Op Diagnosis Codes:     * Ductal carcinoma in situ (DCIS) of right breast [D05 11]    Procedure(s) (LRB):  BREAST MASTECTOMY WITH SENTINEL LYMPH NODE BIOPSY, LYMPHATIC MAPPING WITH BLUE DYE AND RADIOACTIVE DYE (INJECT AT 0800 BY DR BRAVO IN THE OR) (Right)  BREAST INSERTION/PLACEMENT TISSUE EXPANDER (EXCHANGE) (Right)  BREAST RECONSTRUCTION WITH IMPLANT (Right)    Specimen(s):  ID Type Source Tests Collected by Time Destination   1 : right breast sentinel lymph  node Tissue Lymph Node, [place] TISSUE EXAM Joellen Price MD 2020 1402        Estimated Blood Loss:   Minimal    Drains:  Urethral Catheter Latex 16 Fr  (Active)   Site Assessment Clean 20 0905   Collection Container Standard drainage bag 20 09   Securement Method Securing device (Describe) 20 0905   Number of days: 0       Anesthesia Type:   General    Operative Indications:  Ductal carcinoma in situ (DCIS) of right breast [D05 11]      Operative Findings:  Two blue and radioacive nodes, both normal grossly and on touch prep        Complications:   None    Procedure and Technique:  The patient was brought to the operation room and placed under general anesthesia   Attention was paid to ensure appropriate padding and correct positioning   The right breast was injected intradermally with 0 3cc of methylene blue and technetium sulfur colloid and then prepped and draped in a sterile fashion   I initiated a time-out, identifying the patient, the correct side and the above procedure   All parties agreed with the time out      The planned incision was sharply incised   Thin flaps were then elevated using electrocautery starting superiorly and then continuing medially, inferiorly and finally laterally   The tail of Joseph was then dissected away from the axilla proper leaving the breast tethered to the underlying musculature       The sentinel lymph node was identified and removed through the mastectomy incision  The node was blue and radioactive  There were no other radioactive, blue or enlarged lymph nodes in the axilla  The sentinel lymph node was sent to pathology and there was no microscopic evidence of metastasis they identified two nodes within the specimen  The breast was then removed from the muscles in a sub-facial plane   The breast was oriented with sutures (short=superior; medium=medial; long=lateral)  The breast was sent to pathology in formalin for permanent pathologic evaluation  Meticulous hemostasis was maintained with electrocautery   The wound was extensively irrigated and Dr Valery Hathaway entered the room to initiate reconstruction  The counts were correct x2  I was present for the entire procedure (my portion, not the reconstruction)      Patient Disposition:  PACU     SIGNATURE: Darwin Bansal MD  DATE: August 27, 2020  TIME: 9:47 AM

## 2020-08-27 NOTE — DISCHARGE INSTRUCTIONS
[address], [address] [facility], [address] [facility], [facility] [place], [address] [facility] /S / [employer]       No heavy lifting >20 pounds    Do not raise hands above head    Consider using button up shirts so you don't have to raise your hands above your head to put the shirt on    Wear the surgical bra at all times except the shower   If the bra is tight and is leaving marks on the skin unclasp the bottom clasps of the bra    No ice or heating pack to chest    Ok to shower    Measure drain output three times per day    Keep the drains secured below the level of the breast  Securing at the waist level is best    No bathing    Leave the transparent and [place] dressing over the drain site in place until you're seen in the office    Apply antibiotic ointment to drain site three times/day if the transparent and [place] dressing falls off    Do not lay on stomach    Do not lay on your sides    Use a necklace or lanyard in the shower to support drains    No smoking    No pushing or pulling    No repetitive arm movements such as cleaning, gardening etc    Call the office for an appointment in 5-14 days - [phone]

## 2020-08-27 NOTE — ANESTHESIA PROCEDURE NOTES
Peripheral Block    Patient location during procedure: holding area  Start time: 8/27/2020 8:17 AM  Reason for block: at surgeon's request and post-op pain management  Staffing  Anesthesiologist: Mesha Chin MD  Performed: anesthesiologist   Preanesthetic Checklist  Completed: patient identified, site marked, surgical consent, pre-op evaluation, timeout performed, IV checked, risks and benefits discussed and monitors and equipment checked  Peripheral Block  Prep: ChloraPrep  Patient monitoring: continuous pulse ox and frequent blood pressure checks  Anesthesia block type: PEC 1 and PEC 2  Laterality: right  Injection technique: single-shot  Procedures: Doppler guided and ultrasound guided, Ultrasound guidance required for the procedure to increase accuracy and safety of medication placement and decrease risk of complications    Ultrasound permanent image savedbupivacaine (MARCAINE) 0 5 % perineural infiltration, 10 mL (Exparel 20 cc injected in plane )  Needle  Needle type: short-bevel   Needle localization: ultrasound guidance  Test dose: negative  Assessment  Injection assessment: incremental injection and local visualized surrounding nerve on ultrasound  Paresthesia pain: none  Heart rate change: no  Slow fractionated injection: yes  patient tolerated the procedure well with no immediate complications

## 2020-08-27 NOTE — ANESTHESIA POSTPROCEDURE EVALUATION
Post-Op Assessment Note    CV Status:  Stable  Pain Score: 0    Pain management: adequate     Mental Status:  Sleepy   Hydration Status:  Stable   PONV Controlled:  None   Airway Patency:  Patent      Post Op Vitals Reviewed: Yes      Staff: Anesthesiologist, CRNA         No complications documented      BP (P) 142/77 (08/27/20 1151)    Temp (!) (P) 96 9 °F (36 1 °C) (08/27/20 1151)    Pulse (P) 95 (08/27/20 1151)   Resp (P) 18 (08/27/20 1151)    SpO2 (P) 99 % (08/27/20 1151)

## 2020-08-27 NOTE — PROGRESS NOTES
Vancomycin Assessment    Lakshmi Goldman is a 77 y o  female who is currently receiving vancomycin 500 mg every 12 hours for other surgical prophylaxis   Relevant clinical data and objective history reviewed:  Creatinine   Date Value Ref Range Status   08/27/2020 0 93 0 60 - 1 30 mg/dL Final     Comment:     Standardized to IDMS reference method   07/29/2020 0 86 0 60 - 1 30 mg/dL Final     Comment:     Standardized to IDMS reference method   10/28/2019 0 83 0 60 - 1 30 mg/dL Final     Comment:     Standardized to IDMS reference method     /69 (BP Location: Left arm)   Pulse 97   Temp 98 °F (36 7 °C) (Oral)   Resp 17   Ht 4' 10" (1 473 m)   Wt 55 3 kg (122 lb)   SpO2 92%   BMI 25 50 kg/m²   I/O last 3 completed shifts: In: 1130 8 [I V :1130 8]  Out: 290 [Urine:250; Drains:40]  Lab Results   Component Value Date/Time    BUN 14 08/27/2020 05:12 PM    WBC 6 53 07/29/2020 02:50 PM    HGB 13 1 07/29/2020 02:50 PM    HCT 40 7 07/29/2020 02:50 PM    MCV 87 07/29/2020 02:50 PM     (H) 07/29/2020 02:50 PM     Temp Readings from Last 3 Encounters:   08/27/20 98 °F (36 7 °C) (Oral)   08/27/20 97 5 °F (36 4 °C) (Temporal)   07/29/20 97 6 °F (36 4 °C)     Vancomycin Days of Therapy: 1    Assessment/Plan  The patient is currently on vancomycin utilizing scheduled dosing based on actual body weight  Baseline risks associated with therapy include: advanced age  The patient is currently receiving 500 mg every 12 hours and is clinically appropriate and dose will be continued  Pharmacy will also follow closely for s/sx of nephrotoxicity, infusion reactions, and appropriateness of therapy  BMP and CBC will be ordered per protocol  Plan for trough as patient approaches steady state, prior to the 4th  dose at approximately 0500 on 8/29  Due to infection severity, will target a trough of 15-20 (appropriate for most indications)     Pharmacy will continue to follow the patients culture results and clinical progress daily      Zaire Zarate, Pharmacist

## 2020-08-27 NOTE — OP NOTE
OPERATIVE REPORT  PATIENT NAME: Morteza Puckett    :  1955  MRN: [id number]  Pt Location: AN OR ROOM 03    SURGERY DATE: 2020    Surgeon(s) and Role:  Panel 1:     * Rosalind Leyva MD - Primary     * Hortencia Oden MD - Assisting  Panel 2:     * Wandy Carbone MD - Primary    Preop Diagnosis:  Ductal carcinoma in situ (DCIS) of right breast [D05 11]    Post-Op Diagnosis Codes:     * Ductal carcinoma in situ (DCIS) of right breast [D05 11]    Procedure(s) (LRB):  BREAST MASTECTOMY WITH SENTINEL LYMPH NODE BIOPSY, LYMPHATIC MAPPING WITH BLUE DYE AND RADIOACTIVE DYE (INJECT AT 0800 BY DR BRAVO IN THE OR) (Right)  BREAST INSERTION/PLACEMENT TISSUE EXPANDER (EXCHANGE) (Right)  BREAST RECONSTRUCTION WITH IMPLANT (Right)    Specimen(s):  ID Type Source Tests Collected by Time Destination   1 : right breast sentinel lymph  node Tissue Lymph Node, [place] TISSUE EXAM Rosalind Leyva MD 2020 3214    2 : right breast Tissue Breast, Right TISSUE EXAM Rosalind Leyva MD 2020 7362        Estimated Blood Loss:   Minimal    Drains:  Closed/Suction Drain Right Breast Bulb 15 Fr  (Active)   Number of days: 0       Closed/Suction Drain Right Breast Bulb 15 Fr  (Active)   Number of days: 0       [REMOVED] Urethral Catheter Latex 16 Fr  (Removed)   Site Assessment Clean 20 0905   Collection Container Standard drainage bag 20 0905   Securement Method Securing device (Describe) 20 0905   Number of days: 0       Anesthesia Type:   General    Operative Indications:  Ductal carcinoma in situ (DCIS) of right breast [D05 11]      Operative Findings:      Complications:   None    Procedure and Technique:  The patient was marked while standing prior to surgery  The patient was brought to the operating room and placed supine on the operating room table  Time out procedure was performed, SCDs were applied and IV antibiotics were given  The patient underwent right mastectomy and will be dictated separately   The chest was re-prepped and draped using standard surgical technique  Attention was turned to the right breast  The wound was examined and excellent hemostasis was obtained  The Inframammary fold was reconstructed using an internal catrachita flap  A 2-0 PDS suture was used to suture the deep dermis of the marked area of the inframammary fold  The skin was elevated 1 cm and secured to rib periosteum, this was performed across the length of the inframammary fold for and total flap plus elevation of 14cmsq  The superior extent of the dissection was determined by the height of the expander  Flexhd was rehydrated according to protocol  This was cut to the shape of the inframammary fold  The dermal element side was placed facing the skin  The flexhd was inset to the inframammary fold and borders of the new pocket using 2-0 PDS in hoizontal mattress technique  15 [place] round rhoda drains were tunneled laterally and brought out through the lateral chest  This was secured with a 3-0 nylon suture  Excellent hemostasis was achieved  The pocket was irrigated with antibiotic solution and allowed to dwell for 5 minutes  The skin was re prepped, all surgical team members changed their gloves  [place] artoura 500ml expander was prepared  All air was removed from the device  50 ml of saline was injected into the device  The expander was placed into the breast pockets  The expander tabs were inset to the inframammary folds using 2-0 PDS suture  Flex HD was set to cover the entire anterior surface of the implants  The flex HD was inset using 2-0  PDS suture in interrupted technique  The skin was closed using 3-0 vicryl and 3-0 PDS suture in interrupted deep dermal technique  The superficial skin was closed using 3-0 monocryl suture in running subcuticular technique  The wound was cleaned and dried  Benzoin, steri strips and skin glue were applied  A Biopatches were placed around the drain sites   Sterile gauze and a surgical bra was placed  The pa assisted with obtaining hemostasis and retracting  The patient tolerated the procedure well and was taken to the recovery room in stable condition           I was present for the entire procedure and A qualified resident physician was not available    Patient Disposition:  hemodynamically stable and extubated and stable    SIGNATURE: Delfina Frost MD  DATE: August 27, 2020  TIME: 11:39 AM

## 2020-08-28 VITALS
HEIGHT: 58 IN | WEIGHT: 122 LBS | RESPIRATION RATE: 16 BRPM | SYSTOLIC BLOOD PRESSURE: 106 MMHG | OXYGEN SATURATION: 94 % | TEMPERATURE: 98 F | DIASTOLIC BLOOD PRESSURE: 59 MMHG | BODY MASS INDEX: 25.61 KG/M2 | HEART RATE: 89 BPM

## 2020-08-28 RX ADMIN — SODIUM CHLORIDE, SODIUM LACTATE, POTASSIUM CHLORIDE, AND CALCIUM CHLORIDE 100 ML/HR: .6; .31; .03; .02 INJECTION, SOLUTION INTRAVENOUS at 02:56

## 2020-08-28 RX ADMIN — OXYCODONE HYDROCHLORIDE AND ACETAMINOPHEN 2 TABLET: 5; 325 TABLET ORAL at 08:54

## 2020-08-28 RX ADMIN — OXYCODONE HYDROCHLORIDE AND ACETAMINOPHEN 1 TABLET: 5; 325 TABLET ORAL at 04:11

## 2020-08-28 RX ADMIN — VANCOMYCIN HYDROCHLORIDE 500 MG: 500 INJECTION, SOLUTION INTRAVENOUS at 05:07

## 2020-08-28 RX ADMIN — MORPHINE SULFATE 3 MG: 4 INJECTION INTRAVENOUS at 00:38

## 2020-08-28 NOTE — DISCHARGE SUMMARY
Discharge Summary - Medical Colin Flores 77 y o  female MRN: [id number]    [address] Room / Bed: S /S -01 Encounter: [id number]    BRIEF OVERVIEW  Admitting Provider: Octavia Romero MD  Discharge Provider: Octavia Romero MD  Primary Care Physician at Discharge: Denia Jackson [phone]    Discharge To: Home      Admission Date: 8/27/2020     Discharge Date: No discharge date for patient encounter  Code Status: Level 1 - Full Code  Advance Directive and Living Will: <no information>  Power of :        Primary Discharge Diagnosis  Active Problems:    * No active hospital problems  *  Resolved Problems:    * No resolved hospital problems   *        Discharge Disposition: Final discharge disposition not confirmed            [address] [place] [address]    Presenting Problem/History of Present Illness  <principal problem not specified>      Discharge Condition: stable    Patient tolerated the procedure well, recovered and was discharged home in stable condition    Juanis Rios MD  8/28/2020  6:47 AM

## 2020-08-28 NOTE — PROGRESS NOTES
Post Op Check Note -Surgery PA  Mitali Mitzy 77 y o  female MRN: [id number]  Unit/Bed#: S -01 Encounter: [id number]    ASSESSMENT/PLAN:  Problem List     Ductal carcinoma in situ (DCIS) of right breast   76 yo F POD #0 s/p R mastectomy with SLN biopsy  Complains of pain, but doing well  MAGGIE drainage blood tinged, 20/40 cc  · Encouraged patient to ask for pain medication  · OOB as tolerated  · MAGGIE drain care   · CM consult for AM   · Spoke with nursing-will give patient a Percocet now  Subjective/Objective     Subjective:    C/o pain at surgical site  +voided    Objective/Physical Exam:   Blood pressure 128/69, pulse 97, temperature 98 °F (36 7 °C), temperature source Oral, resp  rate 17, height 4' 10" (1 473 m), weight 55 3 kg (122 lb), SpO2 92 %  ,Body mass index is 25 5 kg/m²  General appearance: alert and oriented, in no acute distress  Chest:  R chest flat  No evidence of hematoma  Minimal bruising   MAGGIE drains in place      Current Facility-Administered Medications:     diphenhydrAMINE (BENADRYL) tablet 50 mg, 50 mg, Oral, Q6H PRN, Tae Royal MD    docusate sodium (COLACE) capsule 100 mg, 100 mg, Oral, BID, Donta Severino MD  [facility]  [START ON 8/28/2020] enoxaparin (LOVENOX) subcutaneous injection 40 mg, 40 mg, Subcutaneous, Q24H [address], Tae Royal MD    lactated ringers infusion, 100 mL/hr, Intravenous, Continuous, Donta Severino MD, Last Rate: 100 mL/hr at 08/27/20 1808, 100 mL/hr at 08/27/20 1808    morphine (PF) 4 mg/mL injection 3 mg, 3 mg, Intravenous, Q1H PRN, Tae Royal MD    [facility]ron [facility]) injection 4 mg, 4 mg, Intravenous, Q6H PRN, Donta Severino MD, 4 mg at 08/27/20 1739    oxyCODONE-acetaminophen (PERCOCET) 5-325 mg per tablet 2 tablet, 2 tablet, Oral, Q4H PRN, Tae Royal MD, 1 tablet at 08/27/20 1829    [START ON 8/28/2020] vancomycin (VANCOCIN) IVPB (premix) 500 mg 100 mL, 10 mg/kg, Intravenous, Q12H, Tae Royal MD    Facility-Administered Medications Ordered in Other Encounters:    clindamycin (CLEOCIN) IVPB (premix) 900 mg 50 mL, 900 mg, Intravenous, Once, Marvin Du MD     VTE Pharmacologic Prophylaxis: Sequential compression device Winfred Holter)

## 2020-08-28 NOTE — PLAN OF CARE
Problem: PAIN - ADULT  Goal: Verbalizes/displays adequate comfort level or baseline comfort level  Description: Interventions:  - Encourage patient to monitor pain and request assistance  - Assess pain using appropriate pain scale  - Administer analgesics based on type and severity of pain and evaluate response  - Implement non-pharmacological measures as appropriate and evaluate response  - Consider cultural and social influences on pain and pain management  - Notify physician/advanced practitioner if interventions unsuccessful or patient reports new pain  Outcome: Progressing     Problem: INFECTION - ADULT  Goal: Absence or prevention of progression during hospitalization  Description: INTERVENTIONS:  - Assess and monitor for signs and symptoms of infection  - Monitor lab/diagnostic results  - Monitor all insertion sites, i e  indwelling lines, tubes, and drains  - Monitor endotracheal if appropriate and nasal secretions for changes in amount and color  - [place] appropriate cooling/warming therapies per order  - Administer medications as ordered  - Instruct and encourage patient and family to use good hand hygiene technique  - Identify and instruct in appropriate isolation precautions for identified infection/condition  Outcome: Progressing     Problem: SAFETY ADULT  Goal: Patient will remain free of falls  Description: INTERVENTIONS:  - Assess patient frequently for physical needs  -  Identify cognitive and physical deficits and behaviors that affect risk of falls    -  [place] fall precautions as indicated by assessment   - Educate patient/family on patient safety including physical limitations  - Instruct patient to call for assistance with activity based on assessment  - Modify environment to reduce risk of injury  - Consider OT/PT consult to assist with strengthening/mobility  Outcome: Progressing

## 2020-08-28 NOTE — PROGRESS NOTES
Progress Note - General Surgery   Yariel Chavez 77 y o  female MRN: [id number]  Unit/Bed#: S -01 Encounter: [id number]        Subjective/Objective   Chief Complaint:     Subjective: pain controlled, ambulating, voiding    Objective: skin flaps well perfused, no hematoma or seroma    Assessment:  S/p right mastectomy/te recon    Plan:  abx  dvt prophylaxis  Instructions explained  F/u as outpt      Blood pressure 106/64, pulse 79, temperature 98 °F (36 7 °C), temperature source Oral, resp  rate 16, height 4' 10" (1 473 m), weight 55 3 kg (122 lb), SpO2 92 %  ,Body mass index is 25 5 kg/m²        Intake/Output Summary (Last 24 hours) at 8/28/2020 0645  Last data filed at 8/28/2020 0401  Gross per 24 hour   Intake 2730 83 ml   Output 1440 ml   Net 1290 83 ml       Invasive Devices     Peripheral Intravenous Line            Peripheral IV 08/27/20 Left Forearm less than 1 day          Drain            Closed/Suction Drain Right Breast Bulb 15 Fr  less than 1 day    Closed/Suction Drain Right Breast Bulb 15 Fr  less than 1 day                      Dayday Severino MD  8/28/2020  6:45 AM

## 2020-08-28 NOTE — PROGRESS NOTES
Vancomycin IV Pharmacy-to-Dose Consultation    Anurag Garzon is a 77 y o  female who is currently receiving Vancomycin IV with management by the Pharmacy Consult service  Assessment/Plan:  The patient was reviewed  Renal function is stable and no signs or symptoms of nephrotoxicity and/or infusion reactions were documented in the chart  Based on todays assessment, continue current vancomycin (day # 1) dosing of 500 mg every 12 hours, with a plan for trough to be drawn at 0500 on 08/29/20  We will continue to follow the patients culture results and clinical progress daily      Irina Blas, Pharmacist

## 2020-08-28 NOTE — CASE MANAGEMENT
CM spoke with patient at the bedside  CM introduced self and role  CM discussed VNA services at discharge with patient  Patient educated on [place] [facility]  Patient requesting VNA at discharge for drain care/teaching  Patient stated no preference regarding VNA agency  Patient updated [facility] is able to accept for Home SN  Patient updated [facility] will contact her directly prior to first home visit  LIZANDROVNA will contact within 24-48hrs after discharge  CM provided contact information for [facility] on patient discharge paperwork  Patient's address verified, [place] [address] [address] [facility] [address] [facility]  Patient's best contact number is  (c)  Patient stated visiting nurse will need to be buzzed into apartment complex  Her name is listed outside of apartment complex  Patient requesting extra gauze and container for home  CM updated nursing  Patient stated her son is able to pick her up today at discharge  No other questions/concerns noted at this time  CM sent updated referral to [facility] with plan of care

## 2020-08-31 ENCOUNTER — TELEPHONE (OUTPATIENT)
Dept: SURGICAL ONCOLOGY | Facility: CLINIC | Age: 65
End: 2020-08-31

## 2020-08-31 NOTE — TELEPHONE ENCOUNTER
Post-Op phone call placed to patient  Patient denies any redness, swelling, or discharge from incision  States that the pain is well controlled  Reports that the drains are functioning well and she is comfortable taking care of them  Patient states that visiting nurses have been out already  Post-Op appointment with Dr Reyes Brand verified for 9/9; time moved to 1:00 instead of 8:00, patient verbalized understanding of new appointment time  Patient denies any questions or concerns at this time  Instructed patient to call the office if any problems arise

## 2020-09-08 ENCOUNTER — TELEPHONE (OUTPATIENT)
Dept: SURGICAL ONCOLOGY | Facility: CLINIC | Age: 65
End: 2020-09-08

## 2020-09-09 ENCOUNTER — OFFICE VISIT (OUTPATIENT)
Dept: SURGICAL ONCOLOGY | Facility: CLINIC | Age: 65
End: 2020-09-09

## 2020-09-09 ENCOUNTER — TELEPHONE (OUTPATIENT)
Dept: HEMATOLOGY ONCOLOGY | Facility: CLINIC | Age: 65
End: 2020-09-09

## 2020-09-09 VITALS
TEMPERATURE: 98.1 F | SYSTOLIC BLOOD PRESSURE: 112 MMHG | BODY MASS INDEX: 24.56 KG/M2 | WEIGHT: 117 LBS | HEIGHT: 58 IN | RESPIRATION RATE: 16 BRPM | HEART RATE: 93 BPM | DIASTOLIC BLOOD PRESSURE: 76 MMHG

## 2020-09-09 DIAGNOSIS — D05.11 DUCTAL CARCINOMA IN SITU (DCIS) OF RIGHT BREAST: ICD-10-CM

## 2020-09-09 DIAGNOSIS — Z90.11 STATUS POST RIGHT MASTECTOMY: ICD-10-CM

## 2020-09-09 DIAGNOSIS — Z71.89 OTHER SPECIFIED COUNSELING: Primary | ICD-10-CM

## 2020-09-09 PROCEDURE — 99024 POSTOP FOLLOW-UP VISIT: CPT | Performed by: SURGERY

## 2020-09-09 NOTE — PROGRESS NOTES
Surgical Oncology Breast Post-Op       [address] [facility]  [facility]  [address] [facility] [address]    Jovanni العلي  1955  [id number]  [address]  [facility]  [address] [address]    Chief Complaint:   Leyla Padgett is seen for a post-operative visit of her recent breast surgery  Oncology History:     Oncology History   Ductal carcinoma in situ (DCIS) of right breast   7/1/2020 Biopsy    Right breast stereotactic biopsy  A  10 o'clock, with calcifications  Ductal carcinoma in situ  Grade 1        B  10 o'clock, without calcifications  Ductal carcinoma in situ   Grade 1    Concordant  Appears multifocal  Calcifications cover an area of potentially up to 6 cm in AP dimension  US of right axilla not performed  Left breast clear  7/16/2020 Genetic Testing    Genetic testing done - [facility]  The following genes were evaluated: LAURA, BRCA1, BRCA2, CDH1, CHEK2, PALB2, PTEN, STK11, TP53  Negative result  No pathogenic sequence variants or deletions/dupllications identified     8/27/2020 Surgery    Right breast mastectomy with sentinel lymph node biopsy  Ductal carcinoma in situ  Grade 2  6 7 cm  Margins negative  0/4 Lymph nodes  Stage 0    Immediate reconstruction with tissue expander (Dr Ankur Mullen)         Assessment & Plan:   Assessment/Plan    Patient presents for postoperative visit  Her pathology demonstrated ductal carcinoma in situ  Her sentinel lymph nodes were negative  Her margins were clean  I have recommended she see Medical Oncology for an opinion regarding the benefits of anti hormonal therapy/chemoprevention  Will coordinate appointment with physical therapy  We will see her back in 3 months  History of Present Illness:   See Oncology History    Interval History:   Patient has serous fluid in her drain she has done relatively well    She has minimal complaints of pain  Review of Systems:   Review of Systems   All other systems reviewed and are negative  Past Medical History:     Patient Active Problem List   Diagnosis    Ductal carcinoma in situ (DCIS) of right breast     Past Medical History:   Diagnosis Date    Breast cancer ([facility] [address] ) 2020     Past Surgical History:   Procedure Laterality Date    CHOLECYSTECTOMY      FOOT SURGERY      MAMMO STEREOTACTIC BREAST BIOPSY RIGHT (ALL INC) Right 7/1/2020    MASTECTOMY W/ SENTINEL NODE BIOPSY Right 8/27/2020    Procedure: BREAST MASTECTOMY WITH SENTINEL LYMPH NODE BIOPSY, LYMPHATIC MAPPING WITH BLUE DYE AND RADIOACTIVE DYE (INJECT AT 0800 BY DR BRAVO IN THE OR); Surgeon: Sandie Camargo MD;  [facility];  Service: Surgical Oncology    KS BREAST RECONSTRUC W TISS EXPANDR Right 8/27/2020    Procedure: BREAST INSERTION/PLACEMENT TISSUE EXPANDER (EXCHANGE);   Surgeon: Delfina Frost MD;  [facility];  Service: Plastics    KS IMPLNT BIO IMPLNT FOR SOFT TISSUE REINFORCEMENT Right 8/27/2020    Procedure: BREAST RECONSTRUCTION WITH IMPLANT;  Surgeon: Delfina Frost MD;  [facility];  Service: Plastics    TUBAL LIGATION      WISDOM TOOTH EXTRACTION       Family History   Problem Relation Age of Onset    Endometrial cancer Mother 35    Cancer Mother 48        Bladder    No Known Problems Father     No Known Problems Sister     No Known Problems Daughter     No Known Problems Maternal Grandmother     No Known Problems Maternal Grandfather     No Known Problems Paternal Grandmother     No Known Problems Paternal Grandfather     No Known Problems Maternal Aunt     No Known Problems Maternal Aunt     No Known Problems Paternal Aunt     No Known Problems Paternal Aunt     No Known Problems Paternal Aunt      Social History     Socioeconomic History    Marital status: Single     Spouse name: Not on file    Number of children: Not on file    Years of education: Not on file    Highest education level: Not on file   Occupational History    Not on file   Social Needs    Financial resource strain: Not on file    Food insecurity     Worry: Not on file     Inability: Not on file    Transportation needs     Medical: Not on file     Non-medical: Not on file   Tobacco Use    Smoking status: Never Smoker    Smokeless tobacco: Never Used   Substance and Sexual Activity    Alcohol use: Not Currently    Drug use: Never    Sexual activity: Not on file   Lifestyle    Physical activity     Days per week: Not on file     Minutes per session: Not on file    Stress: Not on file   Relationships    Social connections     Talks on phone: Not on file     Gets together: Not on file     Attends Religion service: Not on file     Active member of club or organization: Not on file     Attends meetings of clubs or organizations: Not on file     Relationship status: Not on file    Intimate partner violence     Fear of current or ex partner: Not on file     Emotionally abused: Not on file     Physically abused: Not on file     Forced sexual activity: Not on file   Other Topics Concern    Not on file   Social History Narrative    Not on file       Current Outpatient Medications:     CALCIUM PO, Take 1 tablet by mouth daily , Disp: , Rfl:     Cholecalciferol (VITAMIN D3) 125 MCG (5000 UT) TABS, Take 5,000 Units by mouth daily, Disp: , Rfl:     Cyanocobalamin (B-12 PO), Take by mouth every other day , Disp: , Rfl:     Menaquinone-7 (VITAMIN K2 PO), Take by mouth daily, Disp: , Rfl:     NON FORMULARY, Take by mouth daily Glucose Support, Disp: , Rfl:     TURMERIC CURCUMIN PO, Take 1 capsule by mouth daily , Disp: , Rfl:   Allergies   Allergen Reactions    Codeine Vomiting    Penicillins Hives       Physical Exams:     Vitals:    09/09/20 1307   BP: 112/76   Pulse: 93   Resp: 16   Temp: 98 1 °F (36 7 °C)     Physical Exam  Chest:      Comments: Examination of the right mastectomy site shows no evidence of infection  She still has her drains in with serous fluid  Results & Discussion:   Patient is now pathologically cancer free  Her margins were negative  I have recommended she see Medical Oncology for an opinion regarding the pros and cons of adjuvant hormonal therapy/chemoprevention  We will see her back in 3 months  I explained that following that she would in follow with her advanced practitioner in a q 6 months basis  She is agreeable to that    I have also recommended physical therapy will provide a prescription for that but only a the final approval of Dr Makayla Gonzales

## 2020-09-09 NOTE — TELEPHONE ENCOUNTER
Devorah Severs from Dr Gillian Rangel office called to refer pt to Dr Ross Bernheim  Pt has been scheduled for the [facility] office on 10/1/2020

## 2020-09-16 ENCOUNTER — SOCIAL WORK (OUTPATIENT)
Dept: INFUSION CENTER | Facility: HOSPITAL | Age: 65
End: 2020-09-16

## 2020-09-16 NOTE — PROGRESS NOTES
Oncology SW Care Manager received completed Distress Thermometer  Ms Lizz Oreilly self scored a 0/10 indicating there are no significant stressors at this time  Based on pt's score, a SW follow up would not be indicated at this time  If pt expresses psychosocial needs, an oncology social work care manager is available to provide support

## 2020-09-21 ENCOUNTER — EVALUATION (OUTPATIENT)
Dept: PHYSICAL THERAPY | Facility: CLINIC | Age: 65
End: 2020-09-21
Payer: COMMERCIAL

## 2020-09-21 DIAGNOSIS — D05.11 DUCTAL CARCINOMA IN SITU (DCIS) OF RIGHT BREAST: ICD-10-CM

## 2020-09-21 DIAGNOSIS — G89.28 POST-MASTECTOMY PAIN SYNDROME: Primary | ICD-10-CM

## 2020-09-21 DIAGNOSIS — Z90.11 STATUS POST RIGHT MASTECTOMY: ICD-10-CM

## 2020-09-21 PROCEDURE — 97162 PT EVAL MOD COMPLEX 30 MIN: CPT | Performed by: PHYSICAL THERAPIST

## 2020-09-21 PROCEDURE — 97110 THERAPEUTIC EXERCISES: CPT | Performed by: PHYSICAL THERAPIST

## 2020-09-21 NOTE — PROGRESS NOTES
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  She presents with pain, decreased strength, decreased ROM, and postural  dysfunction  Due to these impairments, Patient has difficulty performing a/iadls, recreational activities and engaging in social activities  Patient's clinical presentation is consistent with their referring diagnosis  Patient would benefit from home exercise program progressing to strength ABCs program after expander exchange  She has been educated in the signs and symptoms of lymphedema as well as educated in prevention  She has also been  given a home exercise program and is in agreement with the plan of care  Thank you for your referral   Impairments: abnormal or restricted ROM, lacks appropriate home exercise program and poor posture     Symptom irritability: low  Goals  ST Goals - 2-4 weeks  1  Patient will report decreased pain with activity by at least 2 points within 4 weeks  2  Patient will improve ROM 5-10 degrees within 4 weeks  3  Patient will demonstrate ability to actively correct posture without cueing within 4 weeks  4  Patient will perform IADLs without pain in 2 weeks  5  Patient will increase strength by 25% in 4 weeks    LT Goals - Discharge  1  Patient will improve FOTO score to maximum stated or greater by discharge  2  Patient will return to preferred recreational activity without significant pain increase by discharge   3    Patient will return to all  housework related activities without pain by discharge     Plan  Patient would benefit from: skilled physical therapy  Planned therapy interventions: manual therapy, neuromuscular re-education, therapeutic exercise, therapeutic activities, stretching, strengthening, flexibility and functional ROM exercises        Subjective Evaluation    History of Present Illness  Date of surgery: 8/27/2020  Mechanism of injury: surgery  Mechanism of injury: Patient was diagnosed with R BCA on 7/2/2020  She had a R mastectomy with SLNB ( 4 lymph nodes)  with reconstruction  with expander  She has had one fill and will go weekly  CC:  She complains of neck and B UT soreness due to having to sleep on her back  She reports shooting pain through her chest area which doesn't last   She does have some soreness in the axilla  Function:  Patient reports that she is retired  She had returned to going to the gym  She does her daily housework  Recurrent probem    Quality of life: excellent    Pain  Current pain rating: 3  At best pain rating: 3  Location: chest and axilla  Quality: dull ache    Patient Goals  Patient goals for therapy: return to sport/leisure activities and decreased pain          Objective     Static Posture     Shoulders  Rounded      Postural Observations  Seated posture: fair  Standing posture: fair        Active Range of Motion   Left Shoulder   Flexion: 160 degrees   Abduction: 163 degrees   External rotation BTH: WFL  Internal rotation BTB: WFL    Right Shoulder   Flexion: 145 degrees   Abduction: 146 degrees   External rotation BTH: WFL  Internal rotation BTB: L3 WFL    Swelling   Left shoulder swelling details: Lymphedema MMTs                                       LEFT    palm                16 5  wrist                 14  Wrist + 10 cm                18   Wrist + 16 cm     21  Elbow                 22  Wrist + 32                28 5   Wrist + 40                30 5       Right shoulder swelling details: Lymphedema MMTs RIGHT    palm               17  wrist               13 5  Wrist + 10 cm   19  Wrist + 16 cm    21  Elbow                22 5  Wrist + 32               27 5   Wrist + 40               29 5           Flowsheet Rows      Most Recent Value   PT/OT G-Codes   Current Score  60   Projected Score  72             Precautions: BCA      Manuals 9/21                                                                Neuro Re-Ed                                                                                                        Ther Ex             Wall Climbs flex/abd 5 x :10            Doorway Pec St 5 x :10            Shoulder blade Sq X 10            UT St 5 x :10                                                                Ther Activity                                       Gait Training                                       Modalities

## 2020-10-01 ENCOUNTER — CONSULT (OUTPATIENT)
Dept: HEMATOLOGY ONCOLOGY | Facility: CLINIC | Age: 65
End: 2020-10-01
Payer: COMMERCIAL

## 2020-10-01 VITALS
SYSTOLIC BLOOD PRESSURE: 118 MMHG | WEIGHT: 118 LBS | BODY MASS INDEX: 23.79 KG/M2 | TEMPERATURE: 99.1 F | DIASTOLIC BLOOD PRESSURE: 64 MMHG | HEIGHT: 59 IN | HEART RATE: 90 BPM | OXYGEN SATURATION: 98 % | RESPIRATION RATE: 18 BRPM

## 2020-10-01 DIAGNOSIS — Z90.11 STATUS POST RIGHT MASTECTOMY: ICD-10-CM

## 2020-10-01 DIAGNOSIS — D05.11 DUCTAL CARCINOMA IN SITU (DCIS) OF RIGHT BREAST: ICD-10-CM

## 2020-10-01 PROCEDURE — 99204 OFFICE O/P NEW MOD 45 MIN: CPT | Performed by: INTERNAL MEDICINE

## 2020-12-03 ENCOUNTER — ANESTHESIA EVENT (OUTPATIENT)
Dept: PERIOP | Facility: HOSPITAL | Age: 65
End: 2020-12-03
Payer: COMMERCIAL

## 2020-12-04 ENCOUNTER — HOSPITAL ENCOUNTER (OUTPATIENT)
Facility: HOSPITAL | Age: 65
Setting detail: OUTPATIENT SURGERY
Discharge: HOME | End: 2020-12-04
Attending: PLASTIC SURGERY | Admitting: PLASTIC SURGERY
Payer: COMMERCIAL

## 2020-12-04 ENCOUNTER — ANESTHESIA (OUTPATIENT)
Dept: PERIOP | Facility: HOSPITAL | Age: 65
End: 2020-12-04
Payer: COMMERCIAL

## 2020-12-04 VITALS
SYSTOLIC BLOOD PRESSURE: 134 MMHG | HEIGHT: 59 IN | WEIGHT: 118 LBS | TEMPERATURE: 97.4 F | HEART RATE: 90 BPM | RESPIRATION RATE: 15 BRPM | BODY MASS INDEX: 23.79 KG/M2 | OXYGEN SATURATION: 98 % | DIASTOLIC BLOOD PRESSURE: 80 MMHG

## 2020-12-04 VITALS — HEART RATE: 81 BPM

## 2020-12-04 DIAGNOSIS — Z85.3 PERSONAL HISTORY OF MALIGNANT NEOPLASM OF BREAST: ICD-10-CM

## 2020-12-04 PROCEDURE — 88305 TISSUE EXAM BY PATHOLOGIST: CPT | Performed by: PATHOLOGY

## 2020-12-04 PROCEDURE — C1789 PROSTHESIS, BREAST, IMP: HCPCS | Performed by: PLASTIC SURGERY

## 2020-12-04 DEVICE — SMOOTH ROUND MODERATE PLUS PROFILE GEL-FILLED BREAST IMPLANT, 500CC  SMOOTH ROUND SILICONE
Type: IMPLANTABLE DEVICE | Site: BREAST | Status: FUNCTIONAL
Brand: MENTOR MEMORYGEL BREAST IMPLANT

## 2020-12-04 RX ORDER — VANCOMYCIN HYDROCHLORIDE 1 G/200ML
1000 INJECTION, SOLUTION INTRAVENOUS ONCE
Status: COMPLETED | OUTPATIENT
Start: 2020-12-04 | End: 2020-12-04

## 2020-12-04 RX ORDER — MIDAZOLAM HYDROCHLORIDE 2 MG/2ML
INJECTION, SOLUTION INTRAMUSCULAR; INTRAVENOUS AS NEEDED
Status: DISCONTINUED | OUTPATIENT
Start: 2020-12-04 | End: 2020-12-04

## 2020-12-04 RX ORDER — ONDANSETRON 2 MG/ML
4 INJECTION INTRAMUSCULAR; INTRAVENOUS ONCE AS NEEDED
Status: DISCONTINUED | OUTPATIENT
Start: 2020-12-04 | End: 2020-12-04 | Stop reason: HOSPADM

## 2020-12-04 RX ORDER — PROPOFOL 10 MG/ML
INJECTION, EMULSION INTRAVENOUS AS NEEDED
Status: DISCONTINUED | OUTPATIENT
Start: 2020-12-04 | End: 2020-12-04

## 2020-12-04 RX ORDER — LIDOCAINE HYDROCHLORIDE 20 MG/ML
INJECTION, SOLUTION EPIDURAL; INFILTRATION; INTRACAUDAL; PERINEURAL AS NEEDED
Status: DISCONTINUED | OUTPATIENT
Start: 2020-12-04 | End: 2020-12-04

## 2020-12-04 RX ORDER — FENTANYL CITRATE/PF 50 MCG/ML
25 SYRINGE (ML) INJECTION
Status: DISCONTINUED | OUTPATIENT
Start: 2020-12-04 | End: 2020-12-04 | Stop reason: HOSPADM

## 2020-12-04 RX ORDER — FENTANYL CITRATE 50 UG/ML
INJECTION, SOLUTION INTRAMUSCULAR; INTRAVENOUS AS NEEDED
Status: DISCONTINUED | OUTPATIENT
Start: 2020-12-04 | End: 2020-12-04

## 2020-12-04 RX ORDER — ONDANSETRON 2 MG/ML
4 INJECTION INTRAMUSCULAR; INTRAVENOUS EVERY 8 HOURS PRN
Status: CANCELLED | OUTPATIENT
Start: 2020-12-04

## 2020-12-04 RX ORDER — OXYCODONE HYDROCHLORIDE AND ACETAMINOPHEN 5; 325 MG/1; MG/1
2 TABLET ORAL EVERY 4 HOURS PRN
Status: DISCONTINUED | OUTPATIENT
Start: 2020-12-04 | End: 2020-12-04 | Stop reason: HOSPADM

## 2020-12-04 RX ORDER — DIPHENHYDRAMINE HYDROCHLORIDE 50 MG/ML
INJECTION INTRAMUSCULAR; INTRAVENOUS AS NEEDED
Status: DISCONTINUED | OUTPATIENT
Start: 2020-12-04 | End: 2020-12-04

## 2020-12-04 RX ORDER — LIDOCAINE HYDROCHLORIDE AND EPINEPHRINE 10; 10 MG/ML; UG/ML
INJECTION, SOLUTION INFILTRATION; PERINEURAL AS NEEDED
Status: DISCONTINUED | OUTPATIENT
Start: 2020-12-04 | End: 2020-12-04 | Stop reason: HOSPADM

## 2020-12-04 RX ORDER — DEXAMETHASONE SODIUM PHOSPHATE 4 MG/ML
INJECTION, SOLUTION INTRA-ARTICULAR; INTRALESIONAL; INTRAMUSCULAR; INTRAVENOUS; SOFT TISSUE AS NEEDED
Status: DISCONTINUED | OUTPATIENT
Start: 2020-12-04 | End: 2020-12-04

## 2020-12-04 RX ORDER — SODIUM CHLORIDE 9 MG/ML
125 INJECTION, SOLUTION INTRAVENOUS CONTINUOUS
Status: DISCONTINUED | OUTPATIENT
Start: 2020-12-04 | End: 2020-12-04 | Stop reason: HOSPADM

## 2020-12-04 RX ORDER — ONDANSETRON 2 MG/ML
INJECTION INTRAMUSCULAR; INTRAVENOUS AS NEEDED
Status: DISCONTINUED | OUTPATIENT
Start: 2020-12-04 | End: 2020-12-04

## 2020-12-04 RX ADMIN — SODIUM CHLORIDE 125 ML/HR: 0.9 INJECTION, SOLUTION INTRAVENOUS at 12:22

## 2020-12-04 RX ADMIN — PROPOFOL 150 MG: 10 INJECTION, EMULSION INTRAVENOUS at 14:14

## 2020-12-04 RX ADMIN — LIDOCAINE HYDROCHLORIDE 100 MG: 20 INJECTION, SOLUTION EPIDURAL; INFILTRATION; INTRACAUDAL; PERINEURAL at 14:14

## 2020-12-04 RX ADMIN — DIPHENHYDRAMINE HYDROCHLORIDE 50 MG: 50 INJECTION, SOLUTION INTRAMUSCULAR; INTRAVENOUS at 14:10

## 2020-12-04 RX ADMIN — DEXAMETHASONE SODIUM PHOSPHATE 4 MG: 4 INJECTION INTRA-ARTICULAR; INTRALESIONAL; INTRAMUSCULAR; INTRAVENOUS; SOFT TISSUE at 14:20

## 2020-12-04 RX ADMIN — FENTANYL CITRATE 100 MCG: 50 INJECTION, SOLUTION INTRAMUSCULAR; INTRAVENOUS at 14:14

## 2020-12-04 RX ADMIN — FENTANYL CITRATE 100 MCG: 50 INJECTION, SOLUTION INTRAMUSCULAR; INTRAVENOUS at 14:38

## 2020-12-04 RX ADMIN — SODIUM CHLORIDE: 0.9 INJECTION, SOLUTION INTRAVENOUS at 15:00

## 2020-12-04 RX ADMIN — ONDANSETRON 4 MG: 2 INJECTION INTRAMUSCULAR; INTRAVENOUS at 14:20

## 2020-12-04 RX ADMIN — MIDAZOLAM 2 MG: 1 INJECTION INTRAMUSCULAR; INTRAVENOUS at 14:05

## 2020-12-04 RX ADMIN — VANCOMYCIN HYDROCHLORIDE 1000 MG: 1 INJECTION, SOLUTION INTRAVENOUS at 14:00

## 2020-12-14 PROBLEM — Z86.000 HISTORY OF DUCTAL CARCINOMA IN SITU (DCIS) OF BREAST: Status: ACTIVE | Noted: 2020-07-15

## 2020-12-15 ENCOUNTER — TELEPHONE (OUTPATIENT)
Dept: SURGICAL ONCOLOGY | Facility: CLINIC | Age: 65
End: 2020-12-15

## 2020-12-16 ENCOUNTER — TELEPHONE (OUTPATIENT)
Dept: SURGICAL ONCOLOGY | Facility: CLINIC | Age: 65
End: 2020-12-16

## 2020-12-16 DIAGNOSIS — Z86.000 HISTORY OF DUCTAL CARCINOMA IN SITU (DCIS) OF BREAST: Primary | ICD-10-CM

## 2020-12-17 ENCOUNTER — TELEPHONE (OUTPATIENT)
Dept: SURGICAL ONCOLOGY | Facility: CLINIC | Age: 65
End: 2020-12-17

## 2021-03-02 ENCOUNTER — EVALUATION (OUTPATIENT)
Dept: PHYSICAL THERAPY | Facility: REHABILITATION | Age: 66
End: 2021-03-02
Payer: COMMERCIAL

## 2021-03-02 ENCOUNTER — TRANSCRIBE ORDERS (OUTPATIENT)
Dept: PHYSICAL THERAPY | Facility: REHABILITATION | Age: 66
End: 2021-03-02

## 2021-03-02 DIAGNOSIS — M26.609 TEMPOROMANDIBULAR JOINT DYSFUNCTION: Primary | ICD-10-CM

## 2021-03-02 PROCEDURE — 97530 THERAPEUTIC ACTIVITIES: CPT | Performed by: PHYSICAL THERAPIST

## 2021-03-02 PROCEDURE — 97162 PT EVAL MOD COMPLEX 30 MIN: CPT | Performed by: PHYSICAL THERAPIST

## 2021-03-02 NOTE — PROGRESS NOTES
PT Evaluation     Today's date: 3/2/2021  Patient name: Regino Cho  : 1955  MRN: [id number]  Referring provider: Cortney Weiss MD  Dx:   Encounter Diagnosis     ICD-10-CM    1  Temporomandibular joint dysfunction  M26 609        Start Time: 1443  Stop Time: 4945  Total time in clinic (min): 47 minutes    Assessment  Assessment details: Pt is a 77 yof presenting to physical therapy with temporomandibular joint dysfuction  Pt displays decreased R cervical SB and L cervical rotation AROM  She experiences minor pain with L cervical SB  Pt also displays decreased deep cervical neck flexor endurance  Pt's temporomandibular opening AROM is limited with reciprocal clicking in the R TMJ and an S curve deviation to the L noted with oral closing  Pt has increased hypertonicity in L masseter, lateral pterygoid, and SCM  Pt's pain, AROM deficits, decreased endurance, and muscular hypertonicity are resulting in difficulties with eating and constant pain throughout the day that are impacting her quality of life  Continued skilled therapy is needed to increase TMJ and cervical mobility, increase cervical muscle endurance, and decrease pain  Impairments: abnormal muscle firing, abnormal muscle tone, abnormal or restricted ROM, abnormal movement, impaired physical strength, lacks appropriate home exercise program, pain with function and poor posture   Functional limitations: eating  Symptom irritability: highUnderstanding of Dx/Px/POC: good   Prognosis: good    Goals  Short term goals (6 weeks)  1  Pt will subjectively report 25% improvement in pain and function to show improvement in quality of life  2  Pt will increase TMJ opening ROM to 40 mm to allow for decreased pain throughout the day and with daily activities such as eating  3  Pt will display independence with and understanding of HEP to allow for consistent improvement and progression    4  Pt will increase FOTO score to 60/65 to show improvement in pain and function  5  Pt will increase L cervical rotation to 65 degrees to allow for improvement in function as well as decreased neck and TMJ pain  Long term goals (12 weeks)  1  Pt will subjectively report 80% improvement in pain and function to show improvement in quality of life  2  Pt will increase TMJ opening ROM to 45 mm to allow for decreased pain throughout the day and with daily activities such as eating  3  Pt will increase FOTO score to 65/65 or better to show improvement in pain and function  4  Pt will increase L cervical rotation to 80 degrees to allow for improvement in function as well as decreased neck and TMJ pain  Plan  Patient would benefit from: skilled physical therapy  Referral necessary: No  Planned modality interventions: cryotherapy, traction, TENS and thermotherapy: hydrocollator packs  Planned therapy interventions: joint mobilization, manual therapy, neuromuscular re-education, patient education, postural training, strengthening, stretching, therapeutic activities, therapeutic exercise, home exercise program and functional ROM exercises  Frequency: 2x month  Duration in weeks: 12  Treatment plan discussed with: patient        Subjective Evaluation    History of Present Illness  Mechanism of injury: Pt reports she has been dealing with chronic TMJ dysfunction for decades  In the 1990s, pt underwent treatment for the dysfunction by a dentist  She had previously had a nightguard, but currently does not have one  Pt reports that she clenches ad grinds her teeth at night, often waking up to realize she bit her own tongue/cheek while asleep  She has been fitted for a new nightguard and is picking it up at the end of the month  Pt experiences L sided pain and tenderness to jaw muscles upon waking, though reports that some days are worse than others  The pain is throbbing and constant, though it increases in intensity depending on the day   Pt reports occasional headaches throughout the day that have been increasing in frequency  She has had a history of bad sinus infections, but has not had one in awhile  Headache pain occurs under her eyes, in the middle of her forehead, and through her temples  Pt uses topical eucalyptus to decrease headache symptoms  Pt also feels tightness in neck and shoulders throughout the day  Pt reports increased aggravation with chewing steak and other tough foods, as well as with sustained oral opening at the doctor  Pt notices some clicking with opening and closing of her mouth  Recurrent probem    Quality of life: good    Pain  Current pain ratin  At best pain rating: 3  At worst pain rating: 10  Location: L side jaw  Quality: dull ache (swollen-like)  Relieving factors: medications  Aggravating factors: eating  Progression: worsening    Social Support    Employment status: not working (retired)  Treatments  Treatments tried: dental treatment   Current treatment: medication  Patient Goals  Patient goals for therapy: decreased pain, decreased edema, increased strength and increased motion          Objective     Static Posture     Comments  Range of Motion    Cervical  Flex and ext AROM WNL and painfree    Limited OA sidebending noted bilaterally  Cervical flexion rotation: symmetrical and WNL bilaterally  L rotation: 56 deg  R rotation: 84 deg  L SB: 44 with pain  R SB: 42 with feeling of stretch    Tempromandibular joint  Openin mm  Lat dev: L = 12 mm R = 10 mm  S curve noted to the L with oral opening  Crepitus and reciprocal clicking on the R TMJ    Special tests  Deep neck flexor endurance test: 13s    Palpation  Increased hypertonicity in L lateral pterygoid and masseter, as well as L SCM  Mobility  Decreased inferior and anterior mobility of L TMJ  Ambulation     Comments   Pt ambulates with normal gait pattern and no AD at baseline        I have directly supervised and participated in the care of this patient with the therapy student, Duc Davis, SPT  I agree with the details as outlined above as well as during today's session   Shubham Sauceda, PT, DPT       Precautions: breast CA       3/2            Manuals             Inferior anterior TMJ distraction 4x20" b/l - NS                                      Neuro Re-Ed             Isometric TMJ opening 1x10 5" hold                                                                                          Ther Ex             TMJ opening stretch 1x30s                                                                                                       Ther Activity             Pt edu/HEP NS/DS                         Gait Training                                       Modalities

## 2021-03-02 NOTE — LETTER
2021    Yee Lebron, [address] [facility]  [address] [address]    Patient: Telma Price   YOB: 1955   Date of Visit: 3/2/2021     Encounter Diagnosis     ICD-10-CM    1  Temporomandibular joint dysfunction  M26 609        Dear Dr Robinson Gibbs: Thank you for your recent referral of Telma Price  Please review the attached evaluation summary from Sofiya's recent visit  Please verify that you agree with the plan of care by signing the attached order  If you have any questions or concerns, please do not hesitate to call  I sincerely appreciate the opportunity to share in the care of one of your patients and hope to have another opportunity to work with you in the near future  Sincerely,    Luca Helms, PT      Referring Provider:      I certify that I have read the below Plan of Care and certify the need for these services furnished under this plan of treatment while under my care  Yee Lebron MD  [facility]  [address] [address]  Via Fax: [phone]          PT Evaluation     Today's date: 3/2/2021  Patient name: Telma Price  : 1955  MRN: [id number]  Referring provider: Jessica Merlos MD  Dx:   Encounter Diagnosis     ICD-10-CM    1  Temporomandibular joint dysfunction  M26 609        Start Time: 1443  Stop Time: 8655  Total time in clinic (min): 47 minutes    Assessment  Assessment details: Pt is a 77 yof presenting to physical therapy with temporomandibular joint dysfuction  Pt displays decreased R cervical SB and L cervical rotation AROM  She experiences minor pain with L cervical SB  Pt also displays decreased deep cervical neck flexor endurance  Pt's temporomandibular opening AROM is limited with reciprocal clicking in the R TMJ and an S curve deviation to the L noted with oral closing  Pt has increased hypertonicity in L masseter, lateral pterygoid, and SCM   Pt's pain, AROM deficits, decreased endurance, and muscular hypertonicity are resulting in difficulties with eating and constant pain throughout the day that are impacting her quality of life  Continued skilled therapy is needed to increase TMJ and cervical mobility, increase cervical muscle endurance, and decrease pain  Impairments: abnormal muscle firing, abnormal muscle tone, abnormal or restricted ROM, abnormal movement, impaired physical strength, lacks appropriate home exercise program, pain with function and poor posture   Functional limitations: eating  Symptom irritability: highUnderstanding of Dx/Px/POC: good   Prognosis: good    Goals  Short term goals (6 weeks)  1  Pt will subjectively report 25% improvement in pain and function to show improvement in quality of life  2  Pt will increase TMJ opening ROM to 40 mm to allow for decreased pain throughout the day and with daily activities such as eating  3  Pt will display independence with and understanding of HEP to allow for consistent improvement and progression  4  Pt will increase FOTO score to 60/65 to show improvement in pain and function  5  Pt will increase L cervical rotation to 65 degrees to allow for improvement in function as well as decreased neck and TMJ pain  Long term goals (12 weeks)  1  Pt will subjectively report 80% improvement in pain and function to show improvement in quality of life  2  Pt will increase TMJ opening ROM to 45 mm to allow for decreased pain throughout the day and with daily activities such as eating  3  Pt will increase FOTO score to 65/65 or better to show improvement in pain and function  4  Pt will increase L cervical rotation to 80 degrees to allow for improvement in function as well as decreased neck and TMJ pain        Plan  Patient would benefit from: skilled physical therapy  Referral necessary: No  Planned modality interventions: cryotherapy, traction, TENS and thermotherapy: hydrocollator packs  Planned therapy interventions: joint mobilization, manual therapy, neuromuscular re-education, patient education, postural training, strengthening, stretching, therapeutic activities, therapeutic exercise, home exercise program and functional ROM exercises  Frequency: 2x month  Duration in weeks: 12  Treatment plan discussed with: patient        Subjective Evaluation    History of Present Illness  Mechanism of injury: Pt reports she has been dealing with chronic TMJ dysfunction for decades  In the , pt underwent treatment for the dysfunction by a dentist  She had previously had a nightguard, but currently does not have one  Pt reports that she clenches ad grinds her teeth at night, often waking up to realize she bit her own tongue/cheek while asleep  She has been fitted for a new nightguard and is picking it up at the end of the month  Pt experiences L sided pain and tenderness to jaw muscles upon waking, though reports that some days are worse than others  The pain is throbbing and constant, though it increases in intensity depending on the day  Pt reports occasional headaches throughout the day that have been increasing in frequency  She has had a history of bad sinus infections, but has not had one in awhile  Headache pain occurs under her eyes, in the middle of her forehead, and through her temples  Pt uses topical eucalyptus to decrease headache symptoms  Pt also feels tightness in neck and shoulders throughout the day  Pt reports increased aggravation with chewing steak and other tough foods, as well as with sustained oral opening at the doctor  Pt notices some clicking with opening and closing of her mouth              Recurrent probem    Quality of life: good    Pain  Current pain ratin  At best pain rating: 3  At worst pain rating: 10  Location: L side jaw  Quality: dull ache (swollen-like)  Relieving factors: medications  Aggravating factors: eating  Progression: worsening    Social Support    Employment status: not working (retired)  Treatments  Treatments tried: dental treatment   Current treatment: medication  Patient Goals  Patient goals for therapy: decreased pain, decreased edema, increased strength and increased motion          Objective     Static Posture     Comments  Range of Motion    Cervical  Flex and ext AROM WNL and painfree    Limited OA sidebending noted bilaterally  Cervical flexion rotation: symmetrical and WNL bilaterally  L rotation: 56 deg  R rotation: 84 deg  L SB: 44 with pain  R SB: 42 with feeling of stretch    Tempromandibular joint  Openin mm  Lat dev: L = 12 mm R = 10 mm  S curve noted to the L with oral opening  Crepitus and reciprocal clicking on the R TMJ    Special tests  Deep neck flexor endurance test: 13s    Palpation  Increased hypertonicity in L lateral pterygoid and masseter, as well as L SCM  Mobility  Decreased inferior and anterior mobility of L TMJ  Ambulation     Comments   Pt ambulates with normal gait pattern and no AD at baseline  I have directly supervised and participated in the care of this patient with the therapy student, Denice Curry, SPT  I agree with the details as outlined above as well as during today's session   Mindy Vargas, PT, DPT       Precautions: breast CA       3/2            Manuals             Inferior anterior TMJ distraction 4x20" b/l - NS                                      Neuro Re-Ed             Isometric TMJ opening 1x10 5" hold                                                                                          Ther Ex             TMJ opening stretch 1x30s                                                                                                       Ther Activity             Pt edu/HEP NS/DS                         Gait Training                                       Modalities

## 2021-03-15 NOTE — PROGRESS NOTES
Daily Note     Today's date: 3/16/2021  Patient name: Libia Lopez  : 1955  MRN: [id number]  Referring provider: Joshua Harrison MD  Dx:   Encounter Diagnosis     ICD-10-CM    1  Temporomandibular joint dysfunction  M26 609        Start Time:   Stop Time: 6297  Total time in clinic (min): 46 minutes    Subjective: Pt reports she feels an improvement in pain since the initial evaluation and has noticed an improvement with decreased jaw deviation from midline with opening and closing  However, pt reports she has had some posterior neck pain that radiates to the back of her head  Pt is going to  her mouth guard on 3/25 from the dentist         Objective: See treatment diary below  Increased tone of suboccipital muscles noted  Updated HEP:   Jaw Abduction - 1 x daily - 7 x weekly - 2 sets - 10 reps  Seated Cervical Flexion Stretch with Finger Support Behind Neck - 1 x daily - 7 x weekly - 2 sets - 5 reps - 5 hold  Isometric Jaw Deviation - 1 x daily - 7 x weekly - 2 sets - 10 reps - 3 hold  Seated Passive Cervical Retraction - 3 x daily - 7 x weekly - 2-3 sets - 10 reps - 5 hold      Assessment: Pt required cuing during controlled temporomandibular opening as well as with isometric opening and lateral deviation for proper form  Pt also required verbal and tactile cuing during seated chin tucks to maintain good form and avoid compensating by opening her mouth  Pt will benefit from continued skilled outpatient PT to improve strength, to address therapy goals, to reduce pain, and improve function  Plan: Continue per plan of care        Precautions: breast CA       3/2 3/16           Manuals             Inferior anterior TMJ distraction 4x20" b/l - NS 5x30" b/l- NS           STM masseter, lateral pterygoid  NS                        Neuro Re-Ed             Isometric TMJ opening, lateral deviation 1x10 5" hold Deviation b/l 2x10 5" hold           I, T, Y's             Resisted scap retraction Resisted ER             Chin tucks--> chin tuck and lift (progress to prone chin tuck with low trap raise)  seated c/s retract 2x10 5" hold           Controlled opening with tongue on roof of mouth  3x10           Upper cervical flexion (hands behind neck)  5"x5, 2 sets           Ther Ex             TMJ opening stretch 1x30s                                                                                                       Ther Activity             Pt edu/HEP NS/DS                         Gait Training                                       Modalities

## 2021-03-16 ENCOUNTER — OFFICE VISIT (OUTPATIENT)
Dept: PHYSICAL THERAPY | Facility: REHABILITATION | Age: 66
End: 2021-03-16
Payer: COMMERCIAL

## 2021-03-16 DIAGNOSIS — M26.609 TEMPOROMANDIBULAR JOINT DYSFUNCTION: Primary | ICD-10-CM

## 2021-03-16 PROCEDURE — 97112 NEUROMUSCULAR REEDUCATION: CPT | Performed by: PHYSICAL THERAPIST

## 2021-03-16 PROCEDURE — 97140 MANUAL THERAPY 1/> REGIONS: CPT | Performed by: PHYSICAL THERAPIST

## 2021-03-30 ENCOUNTER — APPOINTMENT (OUTPATIENT)
Dept: PHYSICAL THERAPY | Facility: REHABILITATION | Age: 66
End: 2021-03-30
Payer: COMMERCIAL

## 2021-03-30 NOTE — PROGRESS NOTES
Daily Note     Today's date: 3/30/2021  Patient name: Colin Flores  : 1955  MRN: [id number]  Referring provider: Adonay Andersen MD  Dx:   No diagnosis found  Subjective: Pt reports she feels an improvement in pain since the initial evaluation and has noticed an improvement with decreased jaw deviation from midline with opening and closing  However, pt reports she has had some posterior neck pain that radiates to the back of her head  Pt is going to  her mouth guard on 3/25 from the dentist         Objective: See treatment diary below  Increased tone of suboccipital muscles noted  Updated HEP:   Jaw Abduction - 1 x daily - 7 x weekly - 2 sets - 10 reps  Seated Cervical Flexion Stretch with Finger Support Behind Neck - 1 x daily - 7 x weekly - 2 sets - 5 reps - 5 hold  Isometric Jaw Deviation - 1 x daily - 7 x weekly - 2 sets - 10 reps - 3 hold  Seated Passive Cervical Retraction - 3 x daily - 7 x weekly - 2-3 sets - 10 reps - 5 hold      Assessment: Pt required cuing during controlled temporomandibular opening as well as with isometric opening and lateral deviation for proper form  Pt also required verbal and tactile cuing during seated chin tucks to maintain good form and avoid compensating by opening her mouth  Pt will benefit from continued skilled outpatient PT to improve strength, to address therapy goals, to reduce pain, and improve function  Plan: Continue per plan of care        Precautions: breast CA       3/2 3/16           Manuals             Inferior anterior TMJ distraction 4x20" b/l - NS 5x30" b/l- NS           STM masseter, lateral pterygoid  NS                        Neuro Re-Ed             Isometric TMJ opening, lateral deviation 1x10 5" hold Deviation b/l 2x10 5" hold           I, T, Y's             Resisted scap retraction             Resisted ER             Chin tucks--> chin tuck and lift (progress to prone chin tuck with low trap raise)  seated c/s retract 2x10 5" hold           Controlled opening with tongue on roof of mouth  3x10           Upper cervical flexion (hands behind neck)  5"x5, 2 sets           Ther Ex             TMJ opening stretch 1x30s                                                                                                       Ther Activity             Pt edu/HEP NS/DS                         Gait Training                                       Modalities

## 2021-04-02 ENCOUNTER — OFFICE VISIT (OUTPATIENT)
Dept: PHYSICAL THERAPY | Facility: REHABILITATION | Age: 66
End: 2021-04-02
Payer: COMMERCIAL

## 2021-04-02 DIAGNOSIS — M26.609 TEMPOROMANDIBULAR JOINT DYSFUNCTION: Primary | ICD-10-CM

## 2021-04-02 PROCEDURE — 97112 NEUROMUSCULAR REEDUCATION: CPT | Performed by: PHYSICAL MEDICINE & REHABILITATION

## 2021-04-02 PROCEDURE — 97140 MANUAL THERAPY 1/> REGIONS: CPT | Performed by: PHYSICAL MEDICINE & REHABILITATION

## 2021-04-02 NOTE — PROGRESS NOTES
Daily Note     Today's date: 2021  Patient name: Fabio Mattson  : 1955  MRN: [id number]  Referring provider: Emily Reyez MD  Dx:   Encounter Diagnosis     ICD-10-CM    1  Temporomandibular joint dysfunction  M26 609        Start Time:   Stop Time: 54  Total time in clinic (min): 42 minutes    Subjective: Pt reports she feels improvement in jaw pain  She no longer feels the sharp jaw pain, but will have some achy jaw pain as well as referred pain in the back of the head or behind her eye  She was able to pick her  on 3/25/21 and she has noticed an appreciable difference since starting to use it  Objective: See treatment diary below      Assessment: Due to nature of pt's pain, cervical lateral glides were assessed  Pt had hypomobility of cervical spine with lateral glides L to R compared to R to L  Pt reported subjective tenderness with cervical lateral glides from L to R, but reported it felt good like "something that needs to be stretched"  Pt also reported tenderness to L masseter with STM  Prone I's, T's, and Y's were added to exercises to increase scapular stabilization strength, improve posture, and decrease stress on the TMJ  Pt required initial cuing for each exercise as well as continued verbal cuing to engage scapular retractors  I was present throughout the session for direct supervision and assistance and agree with the outlined treatment plan  Bibiana Wong, PT, DPT  Plan: Continue per plan of care        Precautions: breast CA       3/2 3/16 4          Manuals             Inferior anterior TMJ distraction 4x20" b/l - NS 5x30" b/l- NS 5x30" b/l- NS          STM masseter, lateral pterygoid  NS NS          Cervical lateral glides   L to R- S C3-C7          Reassess   NS-cervical lateral glides          Neuro Re-Ed             Isometric TMJ opening, lateral deviation 1x10 5" hold Deviation b/l 2x10 5" hold           I, T, Y's   1x15 each          Resisted scap retraction             Resisted ER             Chin tucks--> chin tuck and lift (progress to prone chin tuck with low trap raise)  seated c/s retract 2x10 5" hold seated c/s retract 2x10 5" hold          Controlled opening with tongue on roof of mouth  3x10           Upper cervical flexion (hands behind neck)  5"x5, 2 sets           Ther Ex             TMJ opening stretch 1x30s                                                                                                       Ther Activity             Pt edu/HEP NS/DS                         Gait Training                                       Modalities

## 2021-04-16 ENCOUNTER — OFFICE VISIT (OUTPATIENT)
Dept: PHYSICAL THERAPY | Facility: REHABILITATION | Age: 66
End: 2021-04-16
Payer: COMMERCIAL

## 2021-04-16 DIAGNOSIS — M26.609 TEMPOROMANDIBULAR JOINT DYSFUNCTION: Primary | ICD-10-CM

## 2021-04-16 PROCEDURE — 97112 NEUROMUSCULAR REEDUCATION: CPT | Performed by: PHYSICAL THERAPIST

## 2021-04-16 PROCEDURE — 97140 MANUAL THERAPY 1/> REGIONS: CPT | Performed by: PHYSICAL THERAPIST

## 2021-04-16 NOTE — PROGRESS NOTES
Daily Note     Today's date: 2021  Patient name: Sydnie Yanes  : 1955  MRN: [id number]  Referring provider: Kaylin Jensen MD  Dx:   Encounter Diagnosis     ICD-10-CM    1  Temporomandibular joint dysfunction  M26 609        Start Time: 1319  Stop Time: 3208  Total time in clinic (min): 46 minutes    Subjective: Pt reports she no longer has constant jaw pain  Will feel occasional referred sharp pain to the back of the head  Objective: See treatment diary below      Assessment: Pt reported relief and "stretching" with TMJ distraction as well as some relief with STM to L cervical spine paraspinals  Pt required initial cuing for form during prone I's, T's, and Y's  [place] rows and low trap wall slides with chin tuck were added to exercises; [place] rows were initially trialed with 15#, but needed to be decreased to 12# due to lack of control  Pt required cuing to engage scapular retractors initially  Tentative discharge 21  I have directly supervised and participated in the care of this patient with the therapy student, Yuly Lizarraga, ANALY  I agree with the details as outlined above as well as during today's session  Yasmeen Temple PT, DPT      Plan: Continue per plan of care        Precautions: breast CA    FOTO  3/2= 57/65  = 76/65       3/2 3/16 4/1 4/16         Manuals             Inferior anterior TMJ distraction 4x20" b/l - NS 5x30" b/l- NS 5x30" b/l- NS 5x30" b/l- NS         STM masseter, lateral pterygoid  NS NS NS- masster, lateral pterygoid, c/s paraspinals         Cervical lateral glides   L to R-  C3-C7 L to R-  C3-C7         Reassess   NS-cervical lateral glides          Neuro Re-Ed             Isometric TMJ opening, lateral deviation 1x10 5" hold Deviation b/l 2x10 5" hold           I, T, Y's   1x15 each 1x10 each         Resisted scap retraction             Resisted ER             Chin tucks--> chin tuck and lift (progress to prone chin tuck with low trap raise)  seated c/s retract 2x10 5" hold seated c/s retract 2x10 5" hold Supine chin tuck- 1x10  qped chin tuck- 2x10         Controlled opening with tongue on roof of mouth  3x10           Maxx rows    2x10 12#         Low trap wall slide with chin tucks    2x10          Upper cervical flexion (hands behind neck)  5"x5, 2 sets           Ther Ex             TMJ opening stretch 1x30s                                                                                                       Ther Activity             Pt edu/HEP NS/DS                         Gait Training                                       Modalities

## 2021-04-30 ENCOUNTER — APPOINTMENT (OUTPATIENT)
Dept: PHYSICAL THERAPY | Facility: REHABILITATION | Age: 66
End: 2021-04-30
Payer: COMMERCIAL

## 2021-05-19 ENCOUNTER — APPOINTMENT (OUTPATIENT)
Dept: LAB | Facility: HOSPITAL | Age: 66
End: 2021-05-19
Payer: COMMERCIAL

## 2021-05-19 ENCOUNTER — TRANSCRIBE ORDERS (OUTPATIENT)
Dept: LAB | Facility: HOSPITAL | Age: 66
End: 2021-05-19

## 2021-05-19 DIAGNOSIS — R10.9 STOMACH ACHE: ICD-10-CM

## 2021-05-19 DIAGNOSIS — E55.9 AVITAMINOSIS D: ICD-10-CM

## 2021-05-19 DIAGNOSIS — R53.83 TIREDNESS: Primary | ICD-10-CM

## 2021-05-19 LAB
25(OH)D3 SERPL-MCNC: 28.5 NG/ML (ref 30–100)
ALBUMIN SERPL BCP-MCNC: 3.7 G/DL (ref 3.5–5)
ALP SERPL-CCNC: 94 U/L (ref 46–116)
ALT SERPL W P-5'-P-CCNC: 29 U/L (ref 12–78)
ANION GAP SERPL CALCULATED.3IONS-SCNC: 8 MMOL/L (ref 4–13)
AST SERPL W P-5'-P-CCNC: 23 U/L (ref 5–45)
BASOPHILS # BLD AUTO: 0.05 THOUSANDS/ΜL (ref 0–0.1)
BASOPHILS NFR BLD AUTO: 1 % (ref 0–1)
BILIRUB SERPL-MCNC: 0.43 MG/DL (ref 0.2–1)
BUN SERPL-MCNC: 14 MG/DL (ref 5–25)
CALCIUM SERPL-MCNC: 9.1 MG/DL (ref 8.3–10.1)
CHLORIDE SERPL-SCNC: 104 MMOL/L (ref 100–108)
CO2 SERPL-SCNC: 28 MMOL/L (ref 21–32)
CREAT SERPL-MCNC: 0.86 MG/DL (ref 0.6–1.3)
EOSINOPHIL # BLD AUTO: 0.13 THOUSAND/ΜL (ref 0–0.61)
EOSINOPHIL NFR BLD AUTO: 2 % (ref 0–6)
ERYTHROCYTE [DISTWIDTH] IN BLOOD BY AUTOMATED COUNT: 13.2 % (ref 11.6–15.1)
FOLATE SERPL-MCNC: 12.6 NG/ML (ref 3.1–17.5)
GFR SERPL CREATININE-BSD FRML MDRD: 70 ML/MIN/1.73SQ M
GLUCOSE P FAST SERPL-MCNC: 88 MG/DL (ref 65–99)
HCT VFR BLD AUTO: 41.1 % (ref 34.8–46.1)
HGB BLD-MCNC: 13.2 G/DL (ref 11.5–15.4)
IMM GRANULOCYTES # BLD AUTO: 0.02 THOUSAND/UL (ref 0–0.2)
IMM GRANULOCYTES NFR BLD AUTO: 0 % (ref 0–2)
LYMPHOCYTES # BLD AUTO: 2.1 THOUSANDS/ΜL (ref 0.6–4.47)
LYMPHOCYTES NFR BLD AUTO: 34 % (ref 14–44)
MCH RBC QN AUTO: 27.8 PG (ref 26.8–34.3)
MCHC RBC AUTO-ENTMCNC: 32.1 G/DL (ref 31.4–37.4)
MCV RBC AUTO: 87 FL (ref 82–98)
MONOCYTES # BLD AUTO: 0.44 THOUSAND/ΜL (ref 0.17–1.22)
MONOCYTES NFR BLD AUTO: 7 % (ref 4–12)
NEUTROPHILS # BLD AUTO: 3.45 THOUSANDS/ΜL (ref 1.85–7.62)
NEUTS SEG NFR BLD AUTO: 56 % (ref 43–75)
NRBC BLD AUTO-RTO: 0 /100 WBCS
PLATELET # BLD AUTO: 430 THOUSANDS/UL (ref 149–390)
PMV BLD AUTO: 10 FL (ref 8.9–12.7)
POTASSIUM SERPL-SCNC: 3.9 MMOL/L (ref 3.5–5.3)
PROT SERPL-MCNC: 7.9 G/DL (ref 6.4–8.2)
RBC # BLD AUTO: 4.75 MILLION/UL (ref 3.81–5.12)
SODIUM SERPL-SCNC: 140 MMOL/L (ref 136–145)
TSH SERPL DL<=0.05 MIU/L-ACNC: 4.51 UIU/ML (ref 0.36–3.74)
VIT B12 SERPL-MCNC: 1785 PG/ML (ref 100–900)
WBC # BLD AUTO: 6.19 THOUSAND/UL (ref 4.31–10.16)

## 2021-05-19 PROCEDURE — 36415 COLL VENOUS BLD VENIPUNCTURE: CPT

## 2021-05-19 PROCEDURE — 82306 VITAMIN D 25 HYDROXY: CPT

## 2021-05-19 PROCEDURE — 82746 ASSAY OF FOLIC ACID SERUM: CPT

## 2021-05-19 PROCEDURE — 84443 ASSAY THYROID STIM HORMONE: CPT

## 2021-05-19 PROCEDURE — 82607 VITAMIN B-12: CPT

## 2021-05-19 PROCEDURE — 80053 COMPREHEN METABOLIC PANEL: CPT

## 2021-05-19 PROCEDURE — 85025 COMPLETE CBC W/AUTO DIFF WBC: CPT

## 2021-06-16 ENCOUNTER — HOSPITAL ENCOUNTER (OUTPATIENT)
Dept: ULTRASOUND IMAGING | Facility: CLINIC | Age: 66
Discharge: HOME | End: 2021-06-16
Payer: COMMERCIAL

## 2021-06-16 ENCOUNTER — HOSPITAL ENCOUNTER (OUTPATIENT)
Dept: MAMMOGRAPHY | Facility: CLINIC | Age: 66
Discharge: HOME | End: 2021-06-16
Payer: COMMERCIAL

## 2021-06-16 VITALS — HEIGHT: 59 IN | BODY MASS INDEX: 23.18 KG/M2 | WEIGHT: 115 LBS

## 2021-06-16 DIAGNOSIS — Z86.000 HISTORY OF DUCTAL CARCINOMA IN SITU (DCIS) OF BREAST: ICD-10-CM

## 2021-06-16 DIAGNOSIS — N63.20 LEFT BREAST LUMP: ICD-10-CM

## 2021-06-16 PROCEDURE — 76642 ULTRASOUND BREAST LIMITED: CPT

## 2021-06-16 PROCEDURE — 77065 DX MAMMO INCL CAD UNI: CPT

## 2021-06-16 PROCEDURE — G0279 TOMOSYNTHESIS, MAMMO: HCPCS

## 2021-06-22 ENCOUNTER — OFFICE VISIT (OUTPATIENT)
Dept: SURGICAL ONCOLOGY | Facility: CLINIC | Age: 66
End: 2021-06-22
Payer: COMMERCIAL

## 2021-06-22 VITALS
SYSTOLIC BLOOD PRESSURE: 124 MMHG | HEIGHT: 59 IN | HEART RATE: 80 BPM | DIASTOLIC BLOOD PRESSURE: 74 MMHG | BODY MASS INDEX: 22.98 KG/M2 | WEIGHT: 114 LBS | OXYGEN SATURATION: 98 % | RESPIRATION RATE: 14 BRPM | TEMPERATURE: 98.7 F

## 2021-06-22 DIAGNOSIS — Z86.000 HISTORY OF DUCTAL CARCINOMA IN SITU (DCIS) OF BREAST: ICD-10-CM

## 2021-06-22 DIAGNOSIS — Z08 ENCOUNTER FOR FOLLOW-UP EXAMINATION AFTER COMPLETED TREATMENT FOR MALIGNANT NEOPLASM: Primary | ICD-10-CM

## 2021-06-22 PROCEDURE — 99213 OFFICE O/P EST LOW 20 MIN: CPT | Performed by: NURSE PRACTITIONER

## 2021-06-22 NOTE — PROGRESS NOTES
Surgical Oncology Follow Up       [address] [place] [address],[address] [facility]  [facility] [place]  [address] [facility] [address]    Ashley Carranza  1955  [id number]  [address] [place] [address],[address] [place] [address]  [facility] [address] [id number]    Chief Complaint   Patient presents with    Follow-up       Assessment/Plan:  1  Encounter for follow-up examination after completed treatment for malignant neoplasm    2  History of ductal carcinoma in situ (DCIS) of breast  - 6 mo f/u visit    Discussion/Summary:  Patient is a 68-year-old female who presents today for follow-up visit for right breast cancer diagnosed in July of 2020  Her pathology revealed DCIS, ER/%  She underwent genetic testing which was negative  She underwent a right mastectomy and sentinel node biopsy with Dr Gena Hadley  She had immediate reconstruction with Dr Bob Fletcher   Adjuvant medical therapy was not recommended  She had a left 3D diagnostic mammogram and left breast ultrasound on June 16, 2021 wound BI-RADS 2, category 3 density  She offers no new complaints today and there are no concerns on today's exam   Specifically, no worrisome findings in the left axillary region where patient appreciated a lump  This was evaluated with ultrasound and revealed no worrisome findings  She is planning on possible fat grafting to the right breast in the near future with Dr Bob Fletcher   I will plan to see her back in 6 months or sooner should the need arise  She was instructed to call with any new concerns or symptoms prior to that time  All of her questions were answered today      History of Present Illness:     Oncology History   History of ductal carcinoma in situ (DCIS) of breast   7/1/2020 Biopsy    Right breast stereotactic biopsy  A  10 o'clock, with calcifications  Ductal carcinoma in situ  Grade 1        B  10 o'clock, without calcifications  Ductal carcinoma in situ Grade 1    Concordant  Appears multifocal  Calcifications cover an area of potentially up to 6 cm in AP dimension  US of right axilla not performed  Left breast clear  7/16/2020 Genetic Testing    Genetic testing done - [facility]  The following genes were evaluated: LAURA, BRCA1, BRCA2, CDH1, CHEK2, PALB2, PTEN, STK11, TP53  Negative result  No pathogenic sequence variants or deletions/dupllications identified     8/27/2020 Surgery    Right breast mastectomy with sentinel lymph node biopsy  Ductal carcinoma in situ  Grade 2  6 7 cm  Margins negative  0/4 Lymph nodes  Stage 0    Immediate reconstruction with tissue expander (Dr Les Demarco)     10/1/2020 - 10/1/2020 Hormone Therapy    Consult with Dr Ronaldo Vidal  Hormonal therapy not recommended          -Interval History:  Patient notices no changes on self-exam   She was reporting a left axillary lump when her arm is in a raised position which was evaluated with mammogram and ultrasound and revealed no worrisome findings  She denies persistent headaches, back pain or bone pain, cough or shortness of breath, abdominal pain  She is planning on having fat grafting and nipple tattooing to the right breast     Review of Systems:  Review of Systems   Constitutional: Negative for activity change, appetite change, chills, fatigue, fever and unexpected weight change  Respiratory: Negative for cough and shortness of breath  Cardiovascular: Negative for chest pain  Gastrointestinal: Negative for abdominal pain, constipation, diarrhea, nausea and vomiting  Musculoskeletal: Negative for arthralgias, back pain, gait problem and myalgias  Skin: Negative for color change and rash  Neurological: Negative for dizziness and headaches  Hematological: Negative for adenopathy  Psychiatric/Behavioral: Negative for agitation and confusion  All other systems reviewed and are negative        Patient Active Problem List   Diagnosis    History of ductal carcinoma in situ (DCIS) of breast    Encounter for follow-up examination after completed treatment for malignant neoplasm     Past Medical History:   Diagnosis Date    Breast cancer ([facility] [address] ) 2020    Ductal carcinoma in situ (DCIS) of right breast 7/15/2020     Past Surgical History:   Procedure Laterality Date    CHOLECYSTECTOMY      COLONOSCOPY      FOOT SURGERY      MAMMO STEREOTACTIC BREAST BIOPSY RIGHT (ALL INC) Right 7/1/2020    MASTECTOMY W/ SENTINEL NODE BIOPSY Right 8/27/2020    Procedure: BREAST MASTECTOMY WITH SENTINEL LYMPH NODE BIOPSY, LYMPHATIC MAPPING WITH BLUE DYE AND RADIOACTIVE DYE (INJECT AT 0800 BY DR BRAVO IN THE OR); Surgeon: Gino Love MD;  [facility];  Service: Surgical Oncology    RI IMPLNT BIO IMPLNT FOR SOFT TISSUE REINFORCEMENT Right 8/27/2020    Procedure: BREAST RECONSTRUCTION WITH IMPLANT;  Surgeon: Maye Jacobsen MD;  [facility];  Service: Plastics    RI INSJ/RPLCMT BREAST IMPLANT SEP DAY MASTECTOMY Right 12/4/2020    Procedure: IMPLANT EXCHANGE;  Surgeon: Maye Jacobsen MD;  [facility];  Service: Plastics    RI CAREN-IMPLANT CAPSULECTOMY BREAST COMPLETE Right 12/4/2020    Procedure: CAPSULECTOMY;  Surgeon: Maye Jacobsen MD;  [facility];  Service: Plastics    RI TISSUE EXPANDER PLACEMENT BREAST RECONSTRUCTION Right 8/27/2020    Procedure: BREAST INSERTION/PLACEMENT TISSUE EXPANDER (EXCHANGE);   Surgeon: Maye Jacobsen MD;  [facility];  Service: Plastics    TUBAL LIGATION      WISDOM TOOTH EXTRACTION       Family History   Problem Relation Age of Onset    Endometrial cancer Mother 35    Cancer Mother 48        Bladder    No Known Problems Father     No Known Problems Sister     No Known Problems Daughter     No Known Problems Maternal Grandmother     No Known Problems Maternal Grandfather     No Known Problems Paternal Grandmother     No Known Problems Paternal Grandfather     No Known Problems Maternal Aunt     No Known Problems Maternal Aunt     No Known Problems Paternal Aunt     No Known Problems Paternal Aunt     No Known Problems Paternal Aunt      Social History     Socioeconomic History    Marital status: Single     Spouse name: Not on file    Number of children: Not on file    Years of education: Not on file    Highest education level: Not on file   Occupational History    Not on file   Tobacco Use    Smoking status: Never Smoker    Smokeless tobacco: Never Used   Vaping Use    Vaping Use: Never used   Substance and Sexual Activity    Alcohol use: Not Currently    Drug use: Never    Sexual activity: Not on file   Other Topics Concern    Not on file   Social History Narrative    Not on file     Social Determinants of Health     Financial Resource Strain:     Difficulty of Paying Living Expenses:    Food Insecurity:     Worried About Running Out of Food in the Last Year:     [address] in the Last Year:    Transportation Needs:     Lack of Transportation (Medical):      Lack of Transportation (Non-Medical):    Physical Activity:     Days of Exercise per Week:     Minutes of Exercise per Session:    Stress:     Feeling of Stress :    Social Connections:     Frequency of Communication with Friends and Family:     Frequency of Social Gatherings with Friends and Family:     Attends Buddhist Services:     Active Member of Clubs or Organizations:     Attends Club or Organization Meetings:     Marital Status:    Intimate Partner Violence:     Fear of Current or Ex-Partner:     Emotionally Abused:     Physically Abused:     Sexually Abused:        Current Outpatient Medications:     CALCIUM PO, Take 1 tablet by mouth daily Unsure of dose, Disp: , Rfl:     Cholecalciferol (VITAMIN D3) 125 MCG (5000 UT) TABS, Take 5,000 Units by mouth daily, Disp: , Rfl:     Cyanocobalamin (B-12 PO), Take 1,000 mcg by mouth every other day , Disp: , Rfl:     clonazePAM (KlonoPIN) 1 MG disintegrating tablet, Take 1 tablet (1 mg total) by mouth 3 (three) times a day Dissolve in 10 ml of water, then swish and gargle and hold in the mouth for 2 min and spit out (Patient not taking: Reported on 6/22/2021), Disp: 90 tablet, Rfl: 0    famotidine (PEPCID) 40 MG tablet, Take 1 tablet (40 mg total) by mouth daily at bedtime (Patient not taking: Reported on 6/22/2021), Disp: 30 tablet, Rfl: 3    Menaquinone-7 (VITAMIN K2 PO), Take by mouth daily (Patient not taking: Reported on 6/22/2021), Disp: , Rfl:     NON FORMULARY, Take 1 tablet by mouth daily Glucose Support  (Patient not taking: Reported on 6/22/2021), Disp: , Rfl:     omeprazole (PriLOSEC) 40 MG capsule, Take 1 capsule (40 mg total) by mouth daily 30 min or more prior to eating or drinking in the morning (Patient not taking: Reported on 6/22/2021), Disp: 30 capsule, Rfl: 3    TURMERIC CURCUMIN PO, Take 1 capsule by mouth daily Unsure of dose (Patient not taking: Reported on 6/22/2021), Disp: , Rfl:   Allergies   Allergen Reactions    Codeine Vomiting    Penicillins Hives     Vitals:    06/22/21 1001   BP: 124/74   Pulse: 80   Resp: 14   Temp: 98 7 °F (37 1 °C)   SpO2: 98%       Physical Exam  Vitals reviewed  Constitutional:       General: She is not in acute distress  Appearance: Normal appearance  She is well-developed  She is not diaphoretic  HENT:      Head: Normocephalic and atraumatic  Cardiovascular:      Rate and Rhythm: Normal rate and regular rhythm  Heart sounds: Normal heart sounds  Pulmonary:      Effort: Pulmonary effort is normal       Breath sounds: Normal breath sounds  Chest:      Breasts:         Right: Skin change (mastectomy scar) present  Left: No swelling, bleeding, inverted nipple, mass, nipple discharge, skin change or tenderness  Comments: Right reconstructed breast with implant present  No masses, skin lesions or changes  No left axillary masses or lymphadenopathy   Suspect fatty tissue in left axilla accounts for patient reported lump   Abdominal:      Palpations: Abdomen is soft  There is no mass  Tenderness: There is no abdominal tenderness  Musculoskeletal:         General: Normal range of motion  Cervical back: Normal range of motion  Lymphadenopathy:      Upper Body:      Right upper body: No supraclavicular or axillary adenopathy  Left upper body: No supraclavicular or axillary adenopathy  Skin:     General: Skin is warm and dry  Findings: No rash  Neurological:      Mental Status: She is alert and oriented to person, place, and time  Psychiatric:         Speech: Speech normal            Results:    Imaging  Mammo diagnostic left w 3d & cad, US breast left limited (diagnostic)    Result Date: 6/16/2021  Narrative: DIAGNOSIS: History of ductal carcinoma in situ (DCIS) of breast TECHNIQUE: Digital diagnostic mammography was performed  Computer Aided Detection (CAD) analyzed all applicable images  Ultrasound of the left breast(s) was performed  COMPARISONS: Prior breast imaging dated: 10/22/2020, 07/01/2020, 07/01/2020, 06/22/2020, 10/17/2019, 04/05/2019, 04/05/2019, and 04/01/2019 RELEVANT HISTORY: Family Breast Cancer History: No known family history of breast cancer  Family Medical History: Family medical history includes endometrial cancer in mother  Personal History: No known relevant hormone history  No known relevant surgical history  Medical history includes breast cancer  RISK ASSESSMENT: 5 Year Tyrer-Cuzick: 1 09 % 10 Year Tyrer-Cuzick: 2 08 % Lifetime Tyrer-Cuzick: 4 2 % TISSUE DENSITY: The breasts are heterogeneously dense, which may obscure small masses  INDICATION: Kyree Nelson is a 77 y o  female presenting for History of breast cancer  FINDINGS: There are no suspicious masses, grouped microcalcifications or areas of architectural distortion  The skin and nipple areolar complex are unremarkable  The area of palpable concern towards the axilla is unremarkable    Ultrasound shows normal appearing lymph nodes, but no evidence of mass  Impression: No suspicious findings  Recommend diagnostic mammogram in 1 year  ASSESSMENT/BI-RADS CATEGORY: Left: 2 - Benign Overall: 2 - Benign RECOMMENDATION:      - Diagnostic mammogram in 1 year for the left breast  Workstation ID: [id number]       I reviewed the above imaging data  Advance Care Planning/Advance Directives:  Discussed disease status, cancer treatment plans and/or cancer treatment goals with the patient

## 2021-12-22 ENCOUNTER — TELEPHONE (OUTPATIENT)
Dept: SURGICAL ONCOLOGY | Facility: CLINIC | Age: 66
End: 2021-12-22

## 2021-12-23 ENCOUNTER — OFFICE VISIT (OUTPATIENT)
Dept: SURGICAL ONCOLOGY | Facility: CLINIC | Age: 66
End: 2021-12-23
Payer: COMMERCIAL

## 2021-12-23 VITALS
WEIGHT: 114.5 LBS | RESPIRATION RATE: 12 BRPM | HEART RATE: 92 BPM | OXYGEN SATURATION: 99 % | SYSTOLIC BLOOD PRESSURE: 122 MMHG | TEMPERATURE: 98.1 F | BODY MASS INDEX: 23.08 KG/M2 | DIASTOLIC BLOOD PRESSURE: 78 MMHG | HEIGHT: 59 IN

## 2021-12-23 DIAGNOSIS — Z08 ENCOUNTER FOR FOLLOW-UP EXAMINATION AFTER COMPLETED TREATMENT FOR MALIGNANT NEOPLASM: Primary | ICD-10-CM

## 2021-12-23 DIAGNOSIS — Z86.000 HISTORY OF DUCTAL CARCINOMA IN SITU (DCIS) OF BREAST: ICD-10-CM

## 2021-12-23 PROCEDURE — 99213 OFFICE O/P EST LOW 20 MIN: CPT | Performed by: NURSE PRACTITIONER

## 2022-05-11 ENCOUNTER — APPOINTMENT (OUTPATIENT)
Dept: LAB | Facility: HOSPITAL | Age: 67
End: 2022-05-11
Payer: COMMERCIAL

## 2022-05-11 ENCOUNTER — HOSPITAL ENCOUNTER (OUTPATIENT)
Dept: RADIOLOGY | Facility: HOSPITAL | Age: 67
Discharge: HOME | End: 2022-05-11
Payer: COMMERCIAL

## 2022-05-11 DIAGNOSIS — D75.838 SECONDARY THROMBOCYTOSIS: ICD-10-CM

## 2022-05-11 DIAGNOSIS — R07.89 OTHER CHEST PAIN: ICD-10-CM

## 2022-05-11 DIAGNOSIS — R79.89 HYPOURICEMIA: ICD-10-CM

## 2022-05-11 LAB
25(OH)D3 SERPL-MCNC: 23.3 NG/ML (ref 30–100)
BASOPHILS # BLD AUTO: 0.03 THOUSANDS/ΜL (ref 0–0.1)
BASOPHILS NFR BLD AUTO: 1 % (ref 0–1)
EOSINOPHIL # BLD AUTO: 0.08 THOUSAND/ΜL (ref 0–0.61)
EOSINOPHIL NFR BLD AUTO: 2 % (ref 0–6)
ERYTHROCYTE [DISTWIDTH] IN BLOOD BY AUTOMATED COUNT: 13.1 % (ref 11.6–15.1)
HCT VFR BLD AUTO: 40 % (ref 34.8–46.1)
HGB BLD-MCNC: 12.7 G/DL (ref 11.5–15.4)
IMM GRANULOCYTES # BLD AUTO: 0.01 THOUSAND/UL (ref 0–0.2)
IMM GRANULOCYTES NFR BLD AUTO: 0 % (ref 0–2)
LYMPHOCYTES # BLD AUTO: 1.66 THOUSANDS/ΜL (ref 0.6–4.47)
LYMPHOCYTES NFR BLD AUTO: 31 % (ref 14–44)
MCH RBC QN AUTO: 27.5 PG (ref 26.8–34.3)
MCHC RBC AUTO-ENTMCNC: 31.8 G/DL (ref 31.4–37.4)
MCV RBC AUTO: 87 FL (ref 82–98)
MONOCYTES # BLD AUTO: 0.4 THOUSAND/ΜL (ref 0.17–1.22)
MONOCYTES NFR BLD AUTO: 8 % (ref 4–12)
NEUTROPHILS # BLD AUTO: 3.15 THOUSANDS/ΜL (ref 1.85–7.62)
NEUTS SEG NFR BLD AUTO: 58 % (ref 43–75)
NRBC BLD AUTO-RTO: 0 /100 WBCS
PLATELET # BLD AUTO: 367 THOUSANDS/UL (ref 149–390)
PMV BLD AUTO: 10.2 FL (ref 8.9–12.7)
RBC # BLD AUTO: 4.62 MILLION/UL (ref 3.81–5.12)
TSH SERPL DL<=0.05 MIU/L-ACNC: 3.28 UIU/ML (ref 0.45–4.5)
WBC # BLD AUTO: 5.33 THOUSAND/UL (ref 4.31–10.16)

## 2022-05-11 PROCEDURE — 71046 X-RAY EXAM CHEST 2 VIEWS: CPT

## 2022-05-11 PROCEDURE — 84443 ASSAY THYROID STIM HORMONE: CPT

## 2022-05-11 PROCEDURE — 36415 COLL VENOUS BLD VENIPUNCTURE: CPT

## 2022-05-11 PROCEDURE — 82306 VITAMIN D 25 HYDROXY: CPT

## 2022-05-11 PROCEDURE — 86038 ANTINUCLEAR ANTIBODIES: CPT

## 2022-05-11 PROCEDURE — 85025 COMPLETE CBC W/AUTO DIFF WBC: CPT

## 2022-05-12 LAB — ANA TITR SER IF: NEGATIVE {TITER}

## 2022-06-17 ENCOUNTER — HOSPITAL ENCOUNTER (OUTPATIENT)
Dept: MAMMOGRAPHY | Facility: CLINIC | Age: 67
Discharge: HOME | End: 2022-06-17
Payer: COMMERCIAL

## 2022-06-17 VITALS — BODY MASS INDEX: 23.18 KG/M2 | HEIGHT: 59 IN | WEIGHT: 115 LBS

## 2022-06-17 DIAGNOSIS — Z86.000 HISTORY OF DUCTAL CARCINOMA IN SITU (DCIS) OF BREAST: ICD-10-CM

## 2022-06-17 PROCEDURE — G0279 TOMOSYNTHESIS, MAMMO: HCPCS

## 2022-06-17 PROCEDURE — 77065 DX MAMMO INCL CAD UNI: CPT

## 2022-06-23 ENCOUNTER — OFFICE VISIT (OUTPATIENT)
Dept: SURGICAL ONCOLOGY | Facility: CLINIC | Age: 67
End: 2022-06-23
Payer: COMMERCIAL

## 2022-06-23 VITALS
RESPIRATION RATE: 16 BRPM | TEMPERATURE: 97.5 F | HEIGHT: 59 IN | WEIGHT: 119 LBS | BODY MASS INDEX: 23.99 KG/M2 | OXYGEN SATURATION: 99 % | SYSTOLIC BLOOD PRESSURE: 132 MMHG | HEART RATE: 88 BPM | DIASTOLIC BLOOD PRESSURE: 78 MMHG

## 2022-06-23 DIAGNOSIS — Z86.000 HISTORY OF DUCTAL CARCINOMA IN SITU (DCIS) OF BREAST: ICD-10-CM

## 2022-06-23 DIAGNOSIS — Z08 ENCOUNTER FOR FOLLOW-UP EXAMINATION AFTER COMPLETED TREATMENT FOR MALIGNANT NEOPLASM: Primary | ICD-10-CM

## 2022-06-23 PROCEDURE — 99213 OFFICE O/P EST LOW 20 MIN: CPT

## 2022-06-23 NOTE — PROGRESS NOTES
Surgical Oncology Follow Up       [address] [facility]  [facility] [place]  [address] [facility] [place] [address]    Fabienne Jacinto  1955  [id number]  [address] [facility]  [facility] [place]  [address] [facility] [address]    No chief complaint on file  Assessment/Plan:  1  Encounter for follow-up examination after completed treatment for malignant neoplasm  - 6 month follow up    2  History of ductal carcinoma in situ (DCIS) of breast      Discussion/Summary:  Patient is a 68-year-old female presenting today for a six-month follow-up for right breast cancer diagnosed in July 2020  Pathology revealed multifocal DCIS ER/%  She underwent genetic testing which was negative  She had a right breast mastectomy with sentinel node biopsy with Dr Tray Herrera and immediate reconstruction with Dr Dione Sanchez   Hormone therapy was not recommended  She had a diagnostic mammogram of the left breast on 06/17/2022 which was BI-RADS 1 category 2 density  There were no concerns on her clinical breast exam   Patient expressed concerns around her right breast implant  On assessment it appears her concerns are ripple is in the implant  She did see Plastic surgery and they said that they could replace the implant however she is not interested in surgery at this time  Patient also discussed discomfort on the right breast in the inframammary fold and some mid right back pain  I recommended physical therapy however she is not interested at that time  I gave her information on it and she said she would call me if it gets worse  Additionally she mentioned however her left breast feels achy all the time  Pain due to her dense breast tissue she may experience some fibrocystic changes and I recommended evening Primrose oil  I will see the patient back in 6 months or sooner should the need arise   She was instructed to call with any questions or concerns prior to this time  All questions were answered today  History of Present Illness:     Oncology History   History of ductal carcinoma in situ (DCIS) of breast   7/1/2020 Biopsy    Right breast stereotactic biopsy  A  10 o'clock, with calcifications  Ductal carcinoma in situ  Grade 1        B  10 o'clock, without calcifications  Ductal carcinoma in situ   Grade 1    Concordant  Appears multifocal  Calcifications cover an area of potentially up to 6 cm in AP dimension  US of right axilla not performed  Left breast clear  7/16/2020 Genetic Testing    Genetic testing done - [facility]  The following genes were evaluated: LAURA, BRCA1, BRCA2, CDH1, CHEK2, PALB2, PTEN, STK11, TP53  Negative result  No pathogenic sequence variants or deletions/dupllications identified     8/27/2020 Surgery    Right breast mastectomy with sentinel lymph node biopsy  Ductal carcinoma in situ  Grade 2  6 7 cm  Margins negative  0/4 Lymph nodes  Stage 0    Immediate reconstruction with tissue expander (Dr Austin Livingston)     10/1/2020 - 10/1/2020 Hormone Therapy    Consult with Dr Melita Andrade  Hormonal therapy not recommended          -Interval History: Patient is a 68-year-old female presenting today for a six-month follow-up for right breast cancer diagnosed in July 2020  She had a diagnostic mammogram of the left breast on 06/17/2022 which was BI-RADS 1 category 2 density  Patient denies changes on her breast exam  She denies persistent headaches, bone pain, back pain, shortness of breath, or abdominal pain  Review of Systems:  Review of Systems   Constitutional: Negative for activity change, appetite change, fatigue and unexpected weight change  Respiratory: Negative for cough and shortness of breath  Cardiovascular: Negative for chest pain  Gastrointestinal: Negative for abdominal pain, diarrhea, nausea and vomiting  Endocrine: Negative for heat intolerance     Musculoskeletal: Negative for arthralgias, back pain and myalgias  Skin: Negative for rash  Neurological: Negative for weakness and headaches  Hematological: Negative for adenopathy  Patient Active Problem List   Diagnosis    History of ductal carcinoma in situ (DCIS) of breast    Encounter for follow-up examination after completed treatment for malignant neoplasm     Past Medical History:   Diagnosis Date    Breast cancer ([address] ) 2020    [facility]    Ductal carcinoma in situ (DCIS) of right breast 07/15/2020     Past Surgical History:   Procedure Laterality Date    BREAST BIOPSY Right 07/01/2020    stereo    CHOLECYSTECTOMY      COLONOSCOPY      FOOT SURGERY      MAMMO STEREOTACTIC BREAST BIOPSY RIGHT (ALL INC) Right 07/01/2020    MASTECTOMY Right 08/27/2020    MASTECTOMY W/ SENTINEL NODE BIOPSY Right 08/27/2020    Procedure: BREAST MASTECTOMY WITH SENTINEL LYMPH NODE BIOPSY, LYMPHATIC MAPPING WITH BLUE DYE AND RADIOACTIVE DYE (INJECT AT 0800 BY DR BRAVO IN THE OR); Surgeon: Dania Krause MD;  [facility];  Service: Surgical Oncology    FL IMPLNT BIO IMPLNT FOR SOFT TISSUE REINFORCEMENT Right 08/27/2020    Procedure: BREAST RECONSTRUCTION WITH IMPLANT;  Surgeon: Lorrie Anderson MD;  [facility];  Service: Plastics    FL INSJ/RPLCMT BREAST IMPLANT SEP DAY MASTECTOMY Right 12/04/2020    Procedure: IMPLANT EXCHANGE;  Surgeon: Lorrie Anderson MD;  [facility];  Service: Plastics    FL CAREN-IMPLANT CAPSULECTOMY BREAST COMPLETE Right 12/04/2020    Procedure: CAPSULECTOMY;  Surgeon: Lorrie Anderson MD;  [facility];  Service: Plastics    FL TISSUE EXPANDER PLACEMENT BREAST RECONSTRUCTION Right 08/27/2020    Procedure: BREAST INSERTION/PLACEMENT TISSUE EXPANDER (EXCHANGE);   Surgeon: Lorrie Anderson MD;  [facility];  Service: Plastics    TUBAL LIGATION      WISDOM TOOTH EXTRACTION       Family History   Problem Relation Age of Onset    Endometrial cancer Mother 35    Cancer Mother 48        Bladder    No Known Problems Father     No Known Problems Sister     No Known Problems Daughter     No Known Problems Maternal Grandmother     No Known Problems Maternal Grandfather     No Known Problems Paternal Grandmother     No Known Problems Paternal Grandfather     No Known Problems Maternal Aunt     No Known Problems Maternal Aunt     No Known Problems Paternal Aunt     No Known Problems Paternal Aunt     No Known Problems Paternal Aunt      Social History     Socioeconomic History    Marital status: Single     Spouse name: Not on file    Number of children: Not on file    Years of education: Not on file    Highest education level: Not on file   Occupational History    Not on file   Tobacco Use    Smoking status: Never Smoker    Smokeless tobacco: Never Used   Vaping Use    Vaping Use: Never used   Substance and Sexual Activity    Alcohol use: Not Currently    Drug use: Never    Sexual activity: Yes   Other Topics Concern    Not on file   Social History Narrative    Not on file     Social Determinants of Health     Financial Resource Strain: Not on file   Food Insecurity: Not on file   Transportation Needs: Not on file   Physical Activity: Not on file   Stress: Not on file   Social Connections: Not on file   Intimate Partner Violence: Not on file   Housing Stability: Not on file       Current Outpatient Medications:     CALCIUM PO, Take 1 tablet by mouth daily Unsure of dose, Disp: , Rfl:     Cholecalciferol (VITAMIN D3) 125 MCG (5000 UT) TABS, Take 5,000 Units by mouth daily, Disp: , Rfl:     Cyanocobalamin (B-12 PO), Take 1,000 mcg by mouth every other day , Disp: , Rfl:     Menaquinone-7 (VITAMIN K2 PO), Take by mouth daily (Patient not taking: Reported on 6/22/2021), Disp: , Rfl:     NON FORMULARY, Take 1 tablet by mouth daily Glucose Support  (Patient not taking: Reported on 6/22/2021), Disp: , Rfl:     TURMERIC CURCUMIN PO, Take 1 capsule by mouth daily Unsure of dose (Patient not taking: Reported on 6/22/2021 ), Disp: , Rfl:   Allergies   Allergen Reactions    Codeine Vomiting    Penicillins Hives     There were no vitals filed for this visit  Physical Exam  Constitutional:       General: She is not in acute distress  Appearance: Normal appearance  Cardiovascular:      Rate and Rhythm: Normal rate and regular rhythm  Pulses: Normal pulses  Heart sounds: Normal heart sounds  Pulmonary:      Effort: Pulmonary effort is normal       Breath sounds: Normal breath sounds  Chest:      Chest wall: No mass  Breasts:      Right: No swelling, mass, skin change, tenderness, axillary adenopathy or supraclavicular adenopathy  Left: No swelling, bleeding, inverted nipple, mass, nipple discharge, skin change, tenderness, axillary adenopathy or supraclavicular adenopathy  Comments: Right breast mastectomy scar  No masses, nodularity, skin changes, nipple changes or discharge (of left breast), or adenopathy appreciated on physical exam      Abdominal:      General: Abdomen is flat  Palpations: Abdomen is soft  Lymphadenopathy:      Upper Body:      Right upper body: No supraclavicular, axillary or pectoral adenopathy  Left upper body: No supraclavicular, axillary or pectoral adenopathy  Skin:     General: Skin is warm  Neurological:      General: No focal deficit present  Mental Status: She is alert and oriented to person, place, and time  Psychiatric:         Mood and Affect: Mood normal          Behavior: Behavior normal            Results:    Imaging  Mammo diagnostic left w 3d & cad    Result Date: 6/17/2022  Narrative: DIAGNOSIS: History of ductal carcinoma in situ (DCIS) of breast TECHNIQUE: Digital diagnostic mammography was performed  Computer Aided Detection (CAD) analyzed all applicable images  Computer Aided Detection (CAD) analyzed all applicable images   COMPARISONS: Prior breast imaging dated: 06/16/2021, 07/01/2020, 07/01/2020, 06/22/2020, 10/17/2019, 04/05/2019, and 04/01/2019 RELEVANT HISTORY: Family Breast Cancer History: No known family history of breast cancer  Family Medical History: Family medical history includes endometrial cancer in mother  Personal History: No known relevant hormone history  Surgical history includes breast biopsy and mastectomy  Medical history includes breast cancer  [facility]ISK ASSESSMENT[facility] due to the patient's history and/or demographic factors  TISSUE DENSITY: The breasts are heterogeneously dense, which may obscure small masses  INDICATION: Ирина Ramírez is a 74 y o  female presenting for annual  FINDINGS: There are no suspicious masses, grouped microcalcifications or areas of unexplained architectural distortion  The skin and nipple areolar complex are unremarkable  Impression:  No evidence of malignancy  ASSESSMENT/BI-RADS CATEGORY: Left: 1 - Negative Overall: 1 - Negative RECOMMENDATION:      - Diagnostic mammogram in 1 year for the left breast  Workstation ID: [id number]      I reviewed the above imaging data  Advance Care Planning/Advance Directives:  Discussed disease status, cancer treatment plans and/or cancer treatment goals with the patient

## 2022-11-18 ENCOUNTER — TELEPHONE (OUTPATIENT)
Dept: HEMATOLOGY ONCOLOGY | Facility: CLINIC | Age: 67
End: 2022-11-18

## 2022-12-26 ENCOUNTER — HOSPITAL ENCOUNTER (OUTPATIENT)
Dept: RADIOLOGY | Facility: HOSPITAL | Age: 67
Discharge: HOME | End: 2022-12-26

## 2022-12-26 ENCOUNTER — APPOINTMENT (OUTPATIENT)
Dept: LAB | Facility: CLINIC | Age: 67
End: 2022-12-26

## 2022-12-26 DIAGNOSIS — R10.13 ABDOMINAL PAIN, EPIGASTRIC: ICD-10-CM

## 2022-12-26 DIAGNOSIS — R10.9 ABDOMINAL PAIN, UNSPECIFIED ABDOMINAL LOCATION: ICD-10-CM

## 2022-12-26 LAB
ALBUMIN SERPL BCP-MCNC: 3.6 G/DL (ref 3.5–5)
ALP SERPL-CCNC: 89 U/L (ref 46–116)
ALT SERPL W P-5'-P-CCNC: 30 U/L (ref 12–78)
AMYLASE SERPL-CCNC: 37 IU/L (ref 25–115)
ANION GAP SERPL CALCULATED.3IONS-SCNC: 4 MMOL/L (ref 4–13)
AST SERPL W P-5'-P-CCNC: 25 U/L (ref 5–45)
BASOPHILS # BLD AUTO: 0.02 THOUSANDS/ÂΜL (ref 0–0.1)
BASOPHILS NFR BLD AUTO: 0 % (ref 0–1)
BILIRUB SERPL-MCNC: 0.31 MG/DL (ref 0.2–1)
BUN SERPL-MCNC: 13 MG/DL (ref 5–25)
CALCIUM SERPL-MCNC: 9.2 MG/DL (ref 8.3–10.1)
CHLORIDE SERPL-SCNC: 108 MMOL/L (ref 96–108)
CO2 SERPL-SCNC: 27 MMOL/L (ref 21–32)
CREAT SERPL-MCNC: 1.06 MG/DL (ref 0.6–1.3)
EOSINOPHIL # BLD AUTO: 0.1 THOUSAND/ÂΜL (ref 0–0.61)
EOSINOPHIL NFR BLD AUTO: 2 % (ref 0–6)
ERYTHROCYTE [DISTWIDTH] IN BLOOD BY AUTOMATED COUNT: 13.1 % (ref 11.6–15.1)
GFR SERPL CREATININE-BSD FRML MDRD: 53 ML/MIN/1.73SQ M
GLUCOSE P FAST SERPL-MCNC: 97 MG/DL (ref 65–99)
HCT VFR BLD AUTO: 38 % (ref 34.8–46.1)
HGB BLD-MCNC: 11.9 G/DL (ref 11.5–15.4)
IMM GRANULOCYTES # BLD AUTO: 0.02 THOUSAND/UL (ref 0–0.2)
IMM GRANULOCYTES NFR BLD AUTO: 0 % (ref 0–2)
LIPASE SERPL-CCNC: 379 U/L (ref 73–393)
LYMPHOCYTES # BLD AUTO: 1.57 THOUSANDS/ÂΜL (ref 0.6–4.47)
LYMPHOCYTES NFR BLD AUTO: 32 % (ref 14–44)
MCH RBC QN AUTO: 27.4 PG (ref 26.8–34.3)
MCHC RBC AUTO-ENTMCNC: 31.3 G/DL (ref 31.4–37.4)
MCV RBC AUTO: 88 FL (ref 82–98)
MONOCYTES # BLD AUTO: 0.39 THOUSAND/ÂΜL (ref 0.17–1.22)
MONOCYTES NFR BLD AUTO: 8 % (ref 4–12)
NEUTROPHILS # BLD AUTO: 2.83 THOUSANDS/ÂΜL (ref 1.85–7.62)
NEUTS SEG NFR BLD AUTO: 58 % (ref 43–75)
NRBC BLD AUTO-RTO: 0 /100 WBCS
PLATELET # BLD AUTO: 337 THOUSANDS/UL (ref 149–390)
PMV BLD AUTO: 10.5 FL (ref 8.9–12.7)
POTASSIUM SERPL-SCNC: 4 MMOL/L (ref 3.5–5.3)
PROT SERPL-MCNC: 7.5 G/DL (ref 6.4–8.4)
RBC # BLD AUTO: 4.34 MILLION/UL (ref 3.81–5.12)
SODIUM SERPL-SCNC: 139 MMOL/L (ref 135–147)
WBC # BLD AUTO: 4.93 THOUSAND/UL (ref 4.31–10.16)

## 2023-01-25 ENCOUNTER — HOSPITAL ENCOUNTER (OUTPATIENT)
Dept: NON INVASIVE DIAGNOSTICS | Facility: HOSPITAL | Age: 68
Discharge: HOME | End: 2023-01-25

## 2023-01-25 DIAGNOSIS — R01.1 CARDIAC MURMUR, UNSPECIFIED: ICD-10-CM

## 2023-01-25 DIAGNOSIS — R07.89 OTHER CHEST PAIN: ICD-10-CM

## 2023-01-31 ENCOUNTER — OFFICE VISIT (OUTPATIENT)
Dept: SURGICAL ONCOLOGY | Facility: CLINIC | Age: 68
End: 2023-01-31

## 2023-01-31 VITALS
BODY MASS INDEX: 24.29 KG/M2 | SYSTOLIC BLOOD PRESSURE: 122 MMHG | OXYGEN SATURATION: 97 % | RESPIRATION RATE: 22 BRPM | TEMPERATURE: 97.1 F | WEIGHT: 120.5 LBS | HEIGHT: 59 IN | DIASTOLIC BLOOD PRESSURE: 74 MMHG | HEART RATE: 90 BPM

## 2023-01-31 DIAGNOSIS — Z08 ENCOUNTER FOR FOLLOW-UP EXAMINATION AFTER COMPLETED TREATMENT FOR MALIGNANT NEOPLASM: Primary | ICD-10-CM

## 2023-01-31 DIAGNOSIS — Z86.000 HISTORY OF DUCTAL CARCINOMA IN SITU (DCIS) OF BREAST: ICD-10-CM

## 2023-01-31 NOTE — PROGRESS NOTES
Surgical Oncology Follow Up       [address]  [facility] [place]  [address]  [place] [address]    Pete Luevano  1955  [id number]  [address]  [facility] [place]  [address] [address]    Chief Complaint   Patient presents with   • Follow-up       Assessment/Plan:  1  Encounter for follow-up examination after completed treatment for malignant neoplasm  - 6 month follow up    2  History of ductal carcinoma in situ (DCIS) of breast  - Mammo diagnostic left w 3d & cad; Future       Discussion/Summary: Patient is a 38-year-old female presenting today for a six-month follow-up for right breast cancer diagnosed in July 2020  Pathology revealed multifocal DCIS ER/%  She underwent genetic testing which was negative  She had a right breast mastectomy with sentinel node biopsy with Dr Cassy Anaya and immediate reconstruction with Dr Brian Henderson   Hormone therapy was not recommended  She is due for a mammogram of the right breast in June  There were no concerns on her breast exam  She notes nerve pain that radiates from the right breast to the back  I will see the patient back in 6 months or sooner should the need arise  She was instructed to call with any questions or concerns prior to this time  All questions were answered today  History of Present Illness:     Oncology History   History of ductal carcinoma in situ (DCIS) of breast   7/1/2020 Biopsy    Right breast stereotactic biopsy  A  10 o'clock, with calcifications  Ductal carcinoma in situ  Grade 1        B  10 o'clock, without calcifications  Ductal carcinoma in situ   Grade 1    Concordant  Appears multifocal  Calcifications cover an area of potentially up to 6 cm in AP dimension  US of right axilla not performed  Left breast clear       7/16/2020 Genetic Testing    Genetic testing done - [facility]  The following genes were evaluated: LAURA, BRCA1, BRCA2, CDH1, CHEK2, PALB2, PTEN, STK11, TP53  Negative result  No pathogenic sequence variants or deletions/dupllications identified     8/27/2020 Surgery    Right breast mastectomy with sentinel lymph node biopsy  Ductal carcinoma in situ  Grade 2  6 7 cm  Margins negative  0/4 Lymph nodes  Stage 0    Immediate reconstruction with tissue expander (Dr Keven Faith)     10/1/2020 - 10/1/2020 Hormone Therapy    Consult with Dr Poncho Sanchez  Hormonal therapy not recommended          -Interval History:  Patient is a 68-year-old female presenting today for a six-month follow-up for right breast cancer diagnosed in July 2020  She is on observation  Patient denies changes on her breast exam  She denies persistent headaches, bone pain, back pain, shortness of breath, or abdominal pain  Review of Systems:  Review of Systems   Constitutional: Negative for activity change, appetite change, fatigue and unexpected weight change  Respiratory: Negative for cough and shortness of breath  Cardiovascular: Negative for chest pain  Gastrointestinal: Negative for abdominal pain, diarrhea, nausea and vomiting  Endocrine: Negative for heat intolerance  Musculoskeletal: Negative for arthralgias, back pain and myalgias  Skin: Negative for rash  Neurological: Negative for weakness and headaches  Hematological: Negative for adenopathy         Patient Active Problem List   Diagnosis   • History of ductal carcinoma in situ (DCIS) of breast   • Encounter for follow-up examination after completed treatment for malignant neoplasm     Past Medical History:   Diagnosis Date   • Breast cancer ([address] ) 2020    [facility]   • Ductal carcinoma in situ (DCIS) of right breast 07/15/2020     Past Surgical History:   Procedure Laterality Date   • BREAST BIOPSY Right 07/01/2020    stereo   • CHOLECYSTECTOMY     • COLONOSCOPY     • FOOT SURGERY     • MAMMO STEREOTACTIC BREAST BIOPSY RIGHT (ALL INC) Right 07/01/2020   • MASTECTOMY Right 08/27/2020   • MASTECTOMY W/ SENTINEL NODE BIOPSY Right 08/27/2020    Procedure: BREAST MASTECTOMY WITH SENTINEL LYMPH NODE BIOPSY, LYMPHATIC MAPPING WITH BLUE DYE AND RADIOACTIVE DYE (INJECT AT 0800 BY DR BRAVO IN THE OR); Surgeon: Marchia Brittle, MD;  [facility];  Service: Surgical Oncology   • GA IMPLNT BIO IMPLNT FOR SOFT TISSUE REINFORCEMENT Right 08/27/2020    Procedure: BREAST RECONSTRUCTION WITH IMPLANT;  Surgeon: Vanessa Valladares MD;  [facility];  Service: Plastics   • GA INSJ/RPLCMT BREAST IMPLANT SEP DAY MASTECTOMY Right 12/04/2020    Procedure: IMPLANT EXCHANGE;  Surgeon: Vanessa Valladares MD;  [facility];  Service: Plastics   • GA CAREN-IMPLANT CAPSULECTOMY BREAST COMPLETE Right 12/04/2020    Procedure: CAPSULECTOMY;  Surgeon: Vanessa Valladares MD;  [facility];  Service: Plastics   • GA TISSUE EXPANDER PLACEMENT BREAST RECONSTRUCTION Right 08/27/2020    Procedure: BREAST INSERTION/PLACEMENT TISSUE EXPANDER (EXCHANGE);   Surgeon: Vanessa Valladares MD;  [facility];  Service: Plastics   • TUBAL LIGATION     • WISDOM TOOTH EXTRACTION       Family History   Problem Relation Age of Onset   • Endometrial cancer Mother 35   • Cancer Mother 48        Bladder   • No Known Problems Father    • No Known Problems Sister    • No Known Problems Daughter    • No Known Problems Maternal Grandmother    • No Known Problems Maternal Grandfather    • No Known Problems Paternal Grandmother    • No Known Problems Paternal Grandfather    • No Known Problems Maternal Aunt    • No Known Problems Maternal Aunt    • No Known Problems Paternal Aunt    • No Known Problems Paternal Aunt    • No Known Problems Paternal Aunt      Social History     Socioeconomic History   • Marital status: Single     Spouse name: Not on file   • Number of children: Not on file   • Years of education: Not on file   • Highest education level: Not on file   Occupational History   • Not on file   Tobacco Use   • Smoking status: Never • Smokeless tobacco: Never   Vaping Use   • Vaping Use: Never used   Substance and Sexual Activity   • Alcohol use: Not Currently   • Drug use: Never   • Sexual activity: Yes   Other Topics Concern   • Not on file   Social History Narrative   • Not on file     Social Determinants of Health     Financial Resource Strain: Not on file   Food Insecurity: Not on file   Transportation Needs: Not on file   Physical Activity: Not on file   Stress: Not on file   Social Connections: Not on file   Intimate Partner Violence: Not on file   Housing Stability: Not on file       Current Outpatient Medications:   •  CALCIUM PO, Take 1 tablet by mouth daily Unsure of dose, Disp: , Rfl:   •  Cholecalciferol (VITAMIN D3) 125 MCG (5000 UT) TABS, Take 5,000 Units by mouth daily, Disp: , Rfl:   •  Cyanocobalamin (B-12 PO), Take 1,000 mcg by mouth every other day , Disp: , Rfl:   •  Menaquinone-7 (VITAMIN K2 PO), Take by mouth daily, Disp: , Rfl:   •  NON FORMULARY, Take 1 tablet by mouth daily Glucose Support, Disp: , Rfl:   •  TURMERIC CURCUMIN PO, Take 1 capsule by mouth daily Unsure of dose, Disp: , Rfl:   Allergies   Allergen Reactions   • Codeine Vomiting   • Penicillins Hives     Vitals:    01/31/23 1102   BP: 122/74   Pulse: 90   Resp: 22   Temp: (!) 97 1 °F (36 2 °C)   SpO2: 97%       Physical Exam  Constitutional:       General: She is not in acute distress  Appearance: Normal appearance  Cardiovascular:      Rate and Rhythm: Normal rate and regular rhythm  Pulses: Normal pulses  Heart sounds: Normal heart sounds  Pulmonary:      Effort: Pulmonary effort is normal       Breath sounds: Normal breath sounds  Chest:      Chest wall: No mass  Breasts:     Right: No swelling, bleeding, mass, skin change or tenderness  Left: No swelling, bleeding, inverted nipple, mass, nipple discharge, skin change or tenderness  Comments: Right breast mastectomy scar  Implant present   No masses, nodularity, skin changes, nipple changes or discharge (of left breast), or adenopathy appreciated on physical exam      Abdominal:      General: Abdomen is flat  Palpations: Abdomen is soft  Lymphadenopathy:      Upper Body:      Right upper body: No supraclavicular, axillary or pectoral adenopathy  Left upper body: No supraclavicular, axillary or pectoral adenopathy  Skin:     General: Skin is warm  Neurological:      General: No focal deficit present  Mental Status: She is alert and oriented to person, place, and time  Psychiatric:         Mood and Affect: Mood normal          Behavior: Behavior normal            Results:    Imaging  No results found  I reviewed the above imaging data  Advance Care Planning/Advance Directives:  Discussed disease status, cancer treatment plans and/or cancer treatment goals with the patient

## 2023-02-09 ENCOUNTER — HOSPITAL ENCOUNTER (OUTPATIENT)
Dept: NON INVASIVE DIAGNOSTICS | Facility: HOSPITAL | Age: 68
Discharge: HOME | End: 2023-02-09

## 2023-02-09 VITALS
HEART RATE: 90 BPM | SYSTOLIC BLOOD PRESSURE: 122 MMHG | HEIGHT: 59 IN | WEIGHT: 120 LBS | BODY MASS INDEX: 24.19 KG/M2 | DIASTOLIC BLOOD PRESSURE: 74 MMHG

## 2023-02-09 LAB
AORTIC ROOT: 2.8 CM
APICAL FOUR CHAMBER EJECTION FRACTION: 60 %
ASCENDING AORTA: 2.6 CM
E WAVE DECELERATION TIME: 201 MS
FRACTIONAL SHORTENING: 38 (ref 28–44)
INTERVENTRICULAR SEPTUM IN DIASTOLE (PARASTERNAL SHORT AXIS VIEW): 1 CM
INTERVENTRICULAR SEPTUM: 1 CM (ref 0.6–1.1)
LAAS-AP2: 6.8 CM2
LAAS-AP4: 10 CM2
LEFT ATRIUM SIZE: 2.9 CM
LEFT INTERNAL DIMENSION IN SYSTOLE: 2.3 CM (ref 2.1–4)
LEFT VENTRICULAR INTERNAL DIMENSION IN DIASTOLE: 3.7 CM (ref 3.5–6)
LEFT VENTRICULAR POSTERIOR WALL IN END DIASTOLE: 0.9 CM
LEFT VENTRICULAR STROKE VOLUME: 41 ML
LVSV (TEICH): 41 ML
MV E'TISSUE VEL-LAT: 10 CM/S
MV E'TISSUE VEL-SEP: 8 CM/S
MV PEAK A VEL: 0.8 M/S
MV PEAK E VEL: 73 CM/S
MV STENOSIS PRESSURE HALF TIME: 58 MS
MV VALVE AREA P 1/2 METHOD: 3.79
RIGHT ATRIUM AREA SYSTOLE A4C: 9.4 CM2
RIGHT VENTRICLE ID DIMENSION: 3.2 CM
SL CV LEFT ATRIUM LENGTH A2C: 3.1 CM
SL CV LV EF: 60
SL CV PED ECHO LEFT VENTRICLE DIASTOLIC VOLUME (MOD BIPLANE) 2D: 60 ML
SL CV PED ECHO LEFT VENTRICLE SYSTOLIC VOLUME (MOD BIPLANE) 2D: 19 ML
TR MAX PG: 15 MMHG
TR PEAK VELOCITY: 1.9 M/S
TRICUSPID ANNULAR PLANE SYSTOLIC EXCURSION: 1.7 CM
TRICUSPID VALVE PEAK REGURGITATION VELOCITY: 1.91 M/S

## 2023-06-19 ENCOUNTER — HOSPITAL ENCOUNTER (OUTPATIENT)
Dept: MAMMOGRAPHY | Facility: CLINIC | Age: 68
Discharge: HOME | End: 2023-06-19
Payer: COMMERCIAL

## 2023-06-19 VITALS — BODY MASS INDEX: 24.19 KG/M2 | HEIGHT: 59 IN | WEIGHT: 120 LBS

## 2023-06-19 DIAGNOSIS — Z86.000 HISTORY OF DUCTAL CARCINOMA IN SITU (DCIS) OF BREAST: ICD-10-CM

## 2023-06-19 PROCEDURE — 77065 DX MAMMO INCL CAD UNI: CPT

## 2023-06-19 PROCEDURE — G0279 TOMOSYNTHESIS, MAMMO: HCPCS

## 2023-08-14 ENCOUNTER — OFFICE VISIT (OUTPATIENT)
Dept: SURGICAL ONCOLOGY | Facility: CLINIC | Age: 68
End: 2023-08-14
Payer: COMMERCIAL

## 2023-08-14 VITALS
OXYGEN SATURATION: 100 % | DIASTOLIC BLOOD PRESSURE: 74 MMHG | BODY MASS INDEX: 22.98 KG/M2 | RESPIRATION RATE: 20 BRPM | HEART RATE: 58 BPM | HEIGHT: 59 IN | WEIGHT: 114 LBS | SYSTOLIC BLOOD PRESSURE: 122 MMHG | TEMPERATURE: 96.5 F

## 2023-08-14 DIAGNOSIS — Z86.000 HISTORY OF DUCTAL CARCINOMA IN SITU (DCIS) OF BREAST: ICD-10-CM

## 2023-08-14 DIAGNOSIS — Z08 ENCOUNTER FOR FOLLOW-UP EXAMINATION AFTER COMPLETED TREATMENT FOR MALIGNANT NEOPLASM: Primary | ICD-10-CM

## 2023-08-14 PROCEDURE — 99213 OFFICE O/P EST LOW 20 MIN: CPT

## 2023-08-14 NOTE — PROGRESS NOTES
Surgical Oncology Follow Up       [address] [facility] [place]  [address]  [place] [address]    Chica Florence  1955  [id number]  [address] [facility] [place]  [address] [place] [address]    No chief complaint on file. Assessment/Plan:  1. Encounter for follow-up examination after completed treatment for malignant neoplasm  - 6 month follow up    2. History of ductal carcinoma in situ (DCIS) of breast       Discussion/Summary: Patient is a 53-year-old female presenting today for a six-month follow-up for right breast cancer diagnosed in July 2020.  Pathology revealed multifocal DCIS ER/%.  She underwent genetic testing which was negative.  She had a right breast mastectomy with sentinel node biopsy with Dr. Annika Cruz and immediate reconstruction with Dr. Pauly Jaimes therapy was not recommended. She had a diagnostic mammogram of the left breast on 6/19/2023 which was BI-RADS 1 category 3 density. There were no concerns on her breast exam. Patient states she gets right sided back pain. I think this is muscular in nature based on her description. I suggested PT but she wants to continue doing stretches at home. I instructed her to call in the future if it changes. All questions were answered today.           History of Present Illness:     Oncology History   History of ductal carcinoma in situ (DCIS) of breast   7/1/2020 Biopsy    Right breast stereotactic biopsy  A. 10 o'clock, with calcifications  Ductal carcinoma in situ  Grade 1        B. 10 o'clock, without calcifications  Ductal carcinoma in situ   Grade 1    Concordant. Appears multifocal. Calcifications cover an area of potentially up to 6 cm in AP dimension. US of right axilla not performed. Left breast clear.      7/16/2020 Genetic Testing    Genetic testing done - [facility]  The following genes were evaluated: LAURA, BRCA1, BRCA2, CDH1, CHEK2, PALB2, PTEN, STK11, TP53  Negative result. No pathogenic sequence variants or deletions/dupllications identified     8/27/2020 Surgery    Right breast mastectomy with sentinel lymph node biopsy  Ductal carcinoma in situ  Grade 2  6.7 cm  Margins negative  0/4 Lymph nodes  Stage 0    Immediate reconstruction with tissue expander (Dr. Radha Petit)     10/1/2020 - 10/1/2020 Hormone Therapy    Consult with Dr. Roberto Ferguson  Hormonal therapy not recommended          -Interval History: Patient is a 55-year-old female presenting today for a six-month follow-up for right breast cancer diagnosed in July 2020. She had a diagnostic mammogram of the left breast on 6/19/2023 which was BI-RADS 1 category 3 density. Patient denies changes on her breast exam. She denies persistent headaches, bone pain, back pain, shortness of breath, or abdominal pain. Review of Systems:  Review of Systems   Constitutional: Negative for activity change, appetite change, fatigue and unexpected weight change. Respiratory: Negative for cough and shortness of breath. Cardiovascular: Negative for chest pain. Gastrointestinal: Negative for abdominal pain, diarrhea, nausea and vomiting. Endocrine: Negative for heat intolerance. Musculoskeletal: Negative for arthralgias, back pain and myalgias. Skin: Negative for rash. Neurological: Negative for weakness and headaches. Hematological: Negative for adenopathy.        Patient Active Problem List   Diagnosis   • History of ductal carcinoma in situ (DCIS) of breast   • Encounter for follow-up examination after completed treatment for malignant neoplasm     Past Medical History:   Diagnosis Date   • Breast cancer ([address]) 2020    [facility]   • Ductal carcinoma in situ (DCIS) of right breast 07/15/2020     Past Surgical History:   Procedure Laterality Date   • BREAST BIOPSY Right 07/01/2020    stereo   • CHOLECYSTECTOMY     • COLONOSCOPY     • FOOT SURGERY     • MAMMO STEREOTACTIC BREAST BIOPSY RIGHT (ALL INC) Right 07/01/2020   • MASTECTOMY Right 08/27/2020   • MASTECTOMY W/ SENTINEL NODE BIOPSY Right 08/27/2020    Procedure: BREAST MASTECTOMY WITH SENTINEL LYMPH NODE BIOPSY, LYMPHATIC MAPPING WITH BLUE DYE AND RADIOACTIVE DYE (INJECT AT 0800 BY DR BRAVO IN THE OR); Surgeon: Hank Colon MD;  [facility];  Service: Surgical Oncology   • IN IMPLNT BIO IMPLNT FOR SOFT TISSUE REINFORCEMENT Right 08/27/2020    Procedure: BREAST RECONSTRUCTION WITH IMPLANT;  Surgeon: Dc Caldera MD;  [facility];  Service: Plastics   • IN INSJ/RPLCMT BREAST IMPLANT SEP DAY MASTECTOMY Right 12/04/2020    Procedure: IMPLANT EXCHANGE;  Surgeon: Dc Caldera MD;  [facility];  Service: Plastics   • IN CAREN-IMPLANT CAPSULECTOMY BREAST COMPLETE Right 12/04/2020    Procedure: CAPSULECTOMY;  Surgeon: Dc Caldera MD;  [facility];  Service: Plastics   • IN TISSUE EXPANDER PLACEMENT BREAST RECONSTRUCTION Right 08/27/2020    Procedure: BREAST INSERTION/PLACEMENT TISSUE EXPANDER (EXCHANGE);   Surgeon: Dc Caldera MD;  [facility];  Service: Plastics   • TUBAL LIGATION     • WISDOM TOOTH EXTRACTION       Family History   Problem Relation Age of Onset   • Endometrial cancer Mother 35   • Cancer Mother 48        Bladder   • No Known Problems Father    • No Known Problems Sister    • No Known Problems Daughter    • No Known Problems Maternal Grandmother    • No Known Problems Maternal Grandfather    • No Known Problems Paternal Grandmother    • No Known Problems Paternal Grandfather    • No Known Problems Brother    • No Known Problems Brother    • No Known Problems Son    • No Known Problems Maternal Aunt    • No Known Problems Maternal Aunt    • No Known Problems Maternal Aunt    • No Known Problems Maternal Uncle    • No Known Problems Maternal Uncle    • No Known Problems Maternal Uncle    • No Known Problems Maternal Uncle    • No Known Problems Maternal Uncle    • No Known Problems Maternal Uncle    • No Known Problems Paternal Aunt    • No Known Problems Paternal Aunt    • No Known Problems Paternal Aunt    • No Known Problems Paternal Uncle    • No Known Problems Paternal Uncle    • No Known Problems Paternal Uncle    • No Known Problems Paternal Uncle    • No Known Problems Paternal Uncle    • No Known Problems Paternal Uncle      Social History     Socioeconomic History   • Marital status: Single     Spouse name: Not on file   • Number of children: Not on file   • Years of education: Not on file   • Highest education level: Not on file   Occupational History   • Not on file   Tobacco Use   • Smoking status: Never   • Smokeless tobacco: Never   Vaping Use   • Vaping Use: Never used   Substance and Sexual Activity   • Alcohol use: Not Currently   • Drug use: Never   • Sexual activity: Yes   Other Topics Concern   • Not on file   Social History Narrative   • Not on file     Social Determinants of Health     Financial Resource Strain: Not on file   Food Insecurity: Not on file   Transportation Needs: Not on file   Physical Activity: Not on file   Stress: Not on file   Social Connections: Not on file   Intimate Partner Violence: Not on file   Housing Stability: Not on file       Current Outpatient Medications:   •  CALCIUM PO, Take 1 tablet by mouth daily Unsure of dose, Disp: , Rfl:   •  Cholecalciferol (VITAMIN D3) 125 MCG (5000 UT) TABS, Take 5,000 Units by mouth daily, Disp: , Rfl:   •  Cyanocobalamin (B-12 PO), Take 1,000 mcg by mouth every other day , Disp: , Rfl:   •  Menaquinone-7 (VITAMIN K2 PO), Take by mouth daily, Disp: , Rfl:   •  NON FORMULARY, Take 1 tablet by mouth daily Glucose Support, Disp: , Rfl:   •  TURMERIC CURCUMIN PO, Take 1 capsule by mouth daily Unsure of dose, Disp: , Rfl:   Allergies   Allergen Reactions   • Codeine Vomiting   • Penicillins Hives     Vitals:    08/14/23 1136   BP: 122/74   Pulse: 58   Resp: 20   Temp: (!) 96.5 °F (35.8 °C)   SpO2: 100% Physical Exam  Constitutional:       General: She is not in acute distress. Appearance: Normal appearance. Cardiovascular:      Rate and Rhythm: Normal rate and regular rhythm. Pulses: Normal pulses. Heart sounds: Normal heart sounds. Pulmonary:      Effort: Pulmonary effort is normal.      Breath sounds: Normal breath sounds. Chest:      Chest wall: No mass. Breasts:     Right: No swelling, bleeding, inverted nipple, mass, nipple discharge, skin change or tenderness. Left: No swelling, bleeding, inverted nipple, mass, nipple discharge, skin change or tenderness. Abdominal:      General: Abdomen is flat. Palpations: Abdomen is soft. Lymphadenopathy:      Upper Body:      Right upper body: No supraclavicular, axillary or pectoral adenopathy. Left upper body: No supraclavicular, axillary or pectoral adenopathy. Skin:     General: Skin is warm. Neurological:      General: No focal deficit present. Mental Status: She is alert and oriented to person, place, and time. Psychiatric:         Mood and Affect: Mood normal.         Behavior: Behavior normal.           Results:    Imaging  No results found. I reviewed the above imaging data. Advance Care Planning/Advance Directives:  Discussed disease status, cancer treatment plans and/or cancer treatment goals with the patient.

## 2023-12-11 ENCOUNTER — TRANSCRIBE ORDERS (OUTPATIENT)
Dept: LAB | Facility: CLINIC | Age: 68
End: 2023-12-11

## 2023-12-11 ENCOUNTER — APPOINTMENT (OUTPATIENT)
Dept: LAB | Facility: CLINIC | Age: 68
End: 2023-12-11
Payer: COMMERCIAL

## 2023-12-11 DIAGNOSIS — R53.82 CHRONIC FATIGUE: ICD-10-CM

## 2023-12-11 DIAGNOSIS — E55.9 AVITAMINOSIS D: ICD-10-CM

## 2023-12-11 DIAGNOSIS — E78.2 MIXED HYPERLIPIDEMIA: ICD-10-CM

## 2023-12-11 DIAGNOSIS — Z00.01 ENCOUNTER FOR GENERAL ADULT MEDICAL EXAMINATION WITH ABNORMAL FINDINGS: Primary | ICD-10-CM

## 2023-12-11 DIAGNOSIS — D75.839 THROMBOCYTHEMIA: ICD-10-CM

## 2023-12-11 DIAGNOSIS — Z00.01 ENCOUNTER FOR GENERAL ADULT MEDICAL EXAMINATION WITH ABNORMAL FINDINGS: ICD-10-CM

## 2023-12-11 LAB
25(OH)D3 SERPL-MCNC: 34 NG/ML (ref 30–100)
ALBUMIN SERPL BCP-MCNC: 4.5 G/DL (ref 3.5–5)
ALP SERPL-CCNC: 69 U/L (ref 34–104)
ALT SERPL W P-5'-P-CCNC: 15 U/L (ref 7–52)
ANION GAP SERPL CALCULATED.3IONS-SCNC: 11 MMOL/L
AST SERPL W P-5'-P-CCNC: 20 U/L (ref 13–39)
BACTERIA UR QL AUTO: ABNORMAL /HPF
BASOPHILS # BLD AUTO: 0.06 THOUSANDS/ÂΜL (ref 0–0.1)
BASOPHILS NFR BLD AUTO: 1 % (ref 0–1)
BILIRUB SERPL-MCNC: 0.52 MG/DL (ref 0.2–1)
BILIRUB UR QL STRIP: NEGATIVE
BUN SERPL-MCNC: 17 MG/DL (ref 5–25)
CALCIUM SERPL-MCNC: 10.2 MG/DL (ref 8.4–10.2)
CHLORIDE SERPL-SCNC: 107 MMOL/L (ref 96–108)
CHOLEST SERPL-MCNC: 230 MG/DL
CLARITY UR: CLEAR
CO2 SERPL-SCNC: 25 MMOL/L (ref 21–32)
COLOR UR: ABNORMAL
CREAT SERPL-MCNC: 1 MG/DL (ref 0.6–1.3)
EOSINOPHIL # BLD AUTO: 0.15 THOUSAND/ÂΜL (ref 0–0.61)
EOSINOPHIL NFR BLD AUTO: 3 % (ref 0–6)
ERYTHROCYTE [DISTWIDTH] IN BLOOD BY AUTOMATED COUNT: 12.9 % (ref 11.6–15.1)
EST. AVERAGE GLUCOSE BLD GHB EST-MCNC: 128 MG/DL
GFR SERPL CREATININE-BSD FRML MDRD: 57 ML/MIN/1.73SQ M
GLUCOSE P FAST SERPL-MCNC: 78 MG/DL (ref 65–99)
GLUCOSE UR STRIP-MCNC: NEGATIVE MG/DL
HBA1C MFR BLD: 6.1 %
HCT VFR BLD AUTO: 40.4 % (ref 34.8–46.1)
HDLC SERPL-MCNC: 60 MG/DL
HGB BLD-MCNC: 13 G/DL (ref 11.5–15.4)
HGB UR QL STRIP.AUTO: NEGATIVE
IMM GRANULOCYTES # BLD AUTO: 0.01 THOUSAND/UL (ref 0–0.2)
IMM GRANULOCYTES NFR BLD AUTO: 0 % (ref 0–2)
KETONES UR STRIP-MCNC: NEGATIVE MG/DL
LDLC SERPL CALC-MCNC: 133 MG/DL (ref 0–100)
LEUKOCYTE ESTERASE UR QL STRIP: ABNORMAL
LYMPHOCYTES # BLD AUTO: 2.26 THOUSANDS/ÂΜL (ref 0.6–4.47)
LYMPHOCYTES NFR BLD AUTO: 38 % (ref 14–44)
MCH RBC QN AUTO: 28.7 PG (ref 26.8–34.3)
MCHC RBC AUTO-ENTMCNC: 32.2 G/DL (ref 31.4–37.4)
MCV RBC AUTO: 89 FL (ref 82–98)
MONOCYTES # BLD AUTO: 0.41 THOUSAND/ÂΜL (ref 0.17–1.22)
MONOCYTES NFR BLD AUTO: 7 % (ref 4–12)
NEUTROPHILS # BLD AUTO: 3.08 THOUSANDS/ÂΜL (ref 1.85–7.62)
NEUTS SEG NFR BLD AUTO: 51 % (ref 43–75)
NITRITE UR QL STRIP: NEGATIVE
NON-SQ EPI CELLS URNS QL MICRO: ABNORMAL /HPF
NONHDLC SERPL-MCNC: 170 MG/DL
NRBC BLD AUTO-RTO: 0 /100 WBCS
PH UR STRIP.AUTO: 5.5 [PH]
PLATELET # BLD AUTO: 302 THOUSANDS/UL (ref 149–390)
PMV BLD AUTO: 11.4 FL (ref 8.9–12.7)
POTASSIUM SERPL-SCNC: 4.4 MMOL/L (ref 3.5–5.3)
PROT SERPL-MCNC: 7.8 G/DL (ref 6.4–8.4)
PROT UR STRIP-MCNC: NEGATIVE MG/DL
RBC # BLD AUTO: 4.53 MILLION/UL (ref 3.81–5.12)
RBC #/AREA URNS AUTO: ABNORMAL /HPF
RENAL EPI CELLS #/AREA URNS HPF: PRESENT /[HPF]
SODIUM SERPL-SCNC: 143 MMOL/L (ref 135–147)
SP GR UR STRIP.AUTO: 1.01 (ref 1–1.03)
TRANS CELLS #/AREA URNS HPF: PRESENT /[HPF]
TRIGL SERPL-MCNC: 184 MG/DL
TSH SERPL DL<=0.05 MIU/L-ACNC: 4.73 UIU/ML (ref 0.45–4.5)
UROBILINOGEN UR STRIP-ACNC: <2 MG/DL
WBC # BLD AUTO: 5.97 THOUSAND/UL (ref 4.31–10.16)
WBC #/AREA URNS AUTO: ABNORMAL /HPF

## 2023-12-11 PROCEDURE — 80061 LIPID PANEL: CPT

## 2023-12-11 PROCEDURE — 82306 VITAMIN D 25 HYDROXY: CPT

## 2023-12-11 PROCEDURE — 80053 COMPREHEN METABOLIC PANEL: CPT

## 2023-12-11 PROCEDURE — 84443 ASSAY THYROID STIM HORMONE: CPT

## 2023-12-11 PROCEDURE — 85025 COMPLETE CBC W/AUTO DIFF WBC: CPT

## 2023-12-11 PROCEDURE — 36415 COLL VENOUS BLD VENIPUNCTURE: CPT

## 2023-12-11 PROCEDURE — 81001 URINALYSIS AUTO W/SCOPE: CPT

## 2023-12-11 PROCEDURE — 83036 HEMOGLOBIN GLYCOSYLATED A1C: CPT

## 2024-01-02 ENCOUNTER — HOSPITAL ENCOUNTER (OUTPATIENT)
Dept: NON INVASIVE DIAGNOSTICS | Facility: HOSPITAL | Age: 69
Discharge: HOME | End: 2024-01-02

## 2024-01-02 DIAGNOSIS — R01.1 UNDIAGNOSED CARDIAC MURMURS: ICD-10-CM

## 2024-01-02 DIAGNOSIS — I51.9 HEART DISEASE, UNSPECIFIED: ICD-10-CM

## 2024-01-26 ENCOUNTER — TRANSCRIBE ORDERS (OUTPATIENT)
Dept: LAB | Facility: CLINIC | Age: 69
End: 2024-01-26

## 2024-01-26 ENCOUNTER — APPOINTMENT (OUTPATIENT)
Dept: LAB | Facility: CLINIC | Age: 69
End: 2024-01-26
Payer: COMMERCIAL

## 2024-01-26 DIAGNOSIS — R31.9 HEMATURIA SYNDROME: ICD-10-CM

## 2024-01-26 DIAGNOSIS — R31.9 HEMATURIA SYNDROME: Primary | ICD-10-CM

## 2024-01-26 LAB
BILIRUB UR QL STRIP: NEGATIVE
CLARITY UR: CLEAR
COLOR UR: YELLOW
GLUCOSE UR STRIP-MCNC: NEGATIVE MG/DL
HGB UR QL STRIP.AUTO: NEGATIVE
KETONES UR STRIP-MCNC: NEGATIVE MG/DL
LEUKOCYTE ESTERASE UR QL STRIP: NEGATIVE
NITRITE UR QL STRIP: NEGATIVE
PH UR STRIP.AUTO: 5.5 [PH]
PROT UR STRIP-MCNC: NEGATIVE MG/DL
SP GR UR STRIP.AUTO: 1.02 (ref 1–1.03)
UROBILINOGEN UR STRIP-ACNC: <2 MG/DL

## 2024-01-26 PROCEDURE — 81003 URINALYSIS AUTO W/O SCOPE: CPT

## 2024-01-29 ENCOUNTER — HOSPITAL ENCOUNTER (OUTPATIENT)
Dept: NON INVASIVE DIAGNOSTICS | Facility: HOSPITAL | Age: 69
Discharge: HOME | End: 2024-01-29
Payer: COMMERCIAL

## 2024-01-29 VITALS
HEART RATE: 87 BPM | WEIGHT: 113.98 LBS | SYSTOLIC BLOOD PRESSURE: 122 MMHG | HEIGHT: 59 IN | BODY MASS INDEX: 22.98 KG/M2 | DIASTOLIC BLOOD PRESSURE: 74 MMHG

## 2024-01-29 LAB
AORTIC ROOT: 2.6 CM
APICAL FOUR CHAMBER EJECTION FRACTION: 69 %
ASCENDING AORTA: 2.9 CM
BSA FOR ECHO PROCEDURE: 1.45 M2
E WAVE DECELERATION TIME: 120 MS
E/A RATIO: 0.8
FRACTIONAL SHORTENING: 36 (ref 28–44)
INTERVENTRICULAR SEPTUM IN DIASTOLE (PARASTERNAL SHORT AXIS VIEW): 1 CM
INTERVENTRICULAR SEPTUM: 1 CM (ref 0.6–1.1)
LAAS-AP2: 14.1 CM2
LAAS-AP4: 11.3 CM2
LEFT ATRIUM SIZE: 2.7 CM
LEFT ATRIUM VOLUME (MOD BIPLANE): 28 ML
LEFT ATRIUM VOLUME INDEX (MOD BIPLANE): 19.3 ML/M2
LEFT INTERNAL DIMENSION IN SYSTOLE: 2.1 CM (ref 2.1–4)
LEFT VENTRICULAR INTERNAL DIMENSION IN DIASTOLE: 3.3 CM (ref 3.5–6)
LEFT VENTRICULAR POSTERIOR WALL IN END DIASTOLE: 1 CM
LEFT VENTRICULAR STROKE VOLUME: 31 ML
LVSV (TEICH): 31 ML
MITRAL REGURGITATION PEAK VELOCITY: 0.98 M/S
MITRAL VALVE MEAN INFLOW VELOCITY: 0.73 M/S
MITRAL VALVE REGURGITANT PEAK GRADIENT: 4 MMHG
MV E'TISSUE VEL-LAT: 10 CM/S
MV E'TISSUE VEL-SEP: 9 CM/S
MV PEAK A VEL: 0.8 M/S
MV PEAK E VEL: 64 CM/S
MV STENOSIS PRESSURE HALF TIME: 35 MS
MV VALVE AREA P 1/2 METHOD: 6.29
RIGHT ATRIUM AREA SYSTOLE A4C: 11.9 CM2
RIGHT VENTRICLE ID DIMENSION: 2.5 CM
SL CV DOP CALC MV VTI RETROGRADE: 22.4 CM
SL CV LEFT ATRIUM LENGTH A2C: 4.9 CM
SL CV LV EF: 65
SL CV MV MEAN GRADIENT RETROGRADE: 2 MMHG
SL CV PED ECHO LEFT VENTRICLE DIASTOLIC VOLUME (MOD BIPLANE) 2D: 45 ML
SL CV PED ECHO LEFT VENTRICLE SYSTOLIC VOLUME (MOD BIPLANE) 2D: 14 ML
TRICUSPID ANNULAR PLANE SYSTOLIC EXCURSION: 2.2 CM

## 2024-01-29 PROCEDURE — 93306 TTE W/DOPPLER COMPLETE: CPT

## 2024-01-29 PROCEDURE — 93306 TTE W/DOPPLER COMPLETE: CPT | Performed by: INTERNAL MEDICINE

## 2024-02-19 ENCOUNTER — OFFICE VISIT (OUTPATIENT)
Dept: SURGICAL ONCOLOGY | Facility: CLINIC | Age: 69
End: 2024-02-19
Payer: COMMERCIAL

## 2024-02-19 VITALS
WEIGHT: 121 LBS | HEART RATE: 102 BPM | TEMPERATURE: 97.2 F | HEIGHT: 59 IN | OXYGEN SATURATION: 100 % | SYSTOLIC BLOOD PRESSURE: 126 MMHG | RESPIRATION RATE: 18 BRPM | BODY MASS INDEX: 24.39 KG/M2 | DIASTOLIC BLOOD PRESSURE: 74 MMHG

## 2024-02-19 DIAGNOSIS — M54.41 MIDLINE LOW BACK PAIN WITH RIGHT-SIDED SCIATICA, UNSPECIFIED CHRONICITY: ICD-10-CM

## 2024-02-19 DIAGNOSIS — Z08 ENCOUNTER FOR FOLLOW-UP EXAMINATION AFTER COMPLETED TREATMENT FOR MALIGNANT NEOPLASM: Primary | ICD-10-CM

## 2024-02-19 DIAGNOSIS — Z86.000 HISTORY OF DUCTAL CARCINOMA IN SITU (DCIS) OF BREAST: ICD-10-CM

## 2024-02-19 PROCEDURE — 99213 OFFICE O/P EST LOW 20 MIN: CPT

## 2024-02-19 NOTE — PROGRESS NOTES
Surgical Oncology Follow Up       [facility] SURGICAL ONCOLOGY [place]  [facility]  [facility] [address]    Sofiya Christinego  1955  [id number]  [facility] SURGICAL ONCOLOGY [place]  [address]  [facility] [address]    Chief Complaint   Patient presents with   • office visit       Assessment/Plan:  1. Encounter for follow-up examination after completed treatment for malignant neoplasm  - 6 mo follow up    2. History of ductal carcinoma in situ (DCIS) of breast  - Mammo diagnostic left w 3d & cad; Future    3. Midline low back pain with right-sided sciatica, unspecified chronicity  - NM bone scan whole body; Future      Discussion/Summary: Patient is a 68-year-old female presenting today for a six-month follow-up for right breast cancer diagnosed in July 2020. Pathology revealed multifocal DCIS ER/%. She underwent genetic testing which was negative. She had a right breast mastectomy with sentinel node biopsy with Dr. Wood and immediate reconstruction with Dr. Severino. Hormonal therapy was not recommended. We will get her scheduled for mammogram in June. There were no concerns on her breast exam.  During patient's visit we had a lengthy discussion about chronic back pain that she has been experiencing.  At patient's last visit 6 months ago she informed me that she started therapy with chiropractic medicine however she still notes discomfort.  Although my suspicions are low for metastatic disease I think it is reasonable to do further workup with bone scan to rule out malignancy or other serious issues.  I believe this is either muscular in nature or related to patient's  macromastia.  She is having chronic pain on her bilateral shoulders and mid back.  She also has history of sciatica on the right side.  Patient does have worries for recurrence.  I think further workup is indicated.  I will call her with the results of  bone scan.  If bone scan is benign I will send her to physical therapy through the [facility] program.  I will see the patient back in 6 months or sooner should the need arise. She was instructed to call with any questions or concerns prior to this time. All questions were answered today.      History of Present Illness:     Oncology History   History of ductal carcinoma in situ (DCIS) of breast   7/1/2020 Biopsy    Right breast stereotactic biopsy  A. 10 o'clock, with calcifications  Ductal carcinoma in situ  Grade 1        B. 10 o'clock, without calcifications  Ductal carcinoma in situ   Grade 1    Concordant. Appears multifocal. Calcifications cover an area of potentially up to 6 cm in AP dimension. US of right axilla not performed. Left breast clear.     7/7/2020 -  Cancer Staged    Staging form: Breast, AJCC 8th Edition  - Pathologic stage from 7/7/2020: Stage 0 (pTis (DCIS), pN0(sn), cM0, G2, ER+, HI+, HER2: Not Assessed) - Signed by EDILIA Delgado on 2/19/2024  Stage prefix: Initial diagnosis  Method of lymph node assessment: [place] lymph node biopsy  Nuclear grade: G1  Multigene prognostic tests performed: None  Histologic grading system: 3 grade system  Laterality: Right  Tumor size (mm): 67       7/16/2020 Genetic Testing    Genetic testing done - [facility]  The following genes were evaluated: LAURA, BRCA1, BRCA2, CDH1, CHEK2, PALB2, PTEN, STK11, TP53  Negative result. No pathogenic sequence variants or deletions/dupllications identified     8/27/2020 Surgery    Right breast mastectomy with sentinel lymph node biopsy  Ductal carcinoma in situ  Grade 2  6.7 cm  Margins negative  0/4 Lymph nodes  Stage 0    Immediate reconstruction with tissue expander (Dr. Severino)     10/1/2020 - 10/1/2020 Hormone Therapy    Consult with Dr. Myles  Hormonal therapy not recommended          -Interval History: Patient is a 68-year-old female presenting today for a six-month follow-up for right  breast cancer diagnosed in July 2020. She has been on obs. We will get her scheduled for mammogram in June. She notes persistent back pain with hx of sciatica on the right side. She feels relief with warm compress. She does not have relief with supportive bras, she feels as though the right breast under the implant at the inframammary fold is still sore. Patient does have worries for recurrence. Patient denies changes on her breast exam. She denies persistent headaches, shortness of breath, or abdominal pain.      Review of Systems:  Review of Systems   Constitutional:  Negative for activity change, appetite change, fatigue and unexpected weight change.   Respiratory:  Negative for cough and shortness of breath.    Cardiovascular:  Negative for chest pain.   Gastrointestinal:  Negative for abdominal pain, diarrhea, nausea and vomiting.   Endocrine: Negative for heat intolerance.   Musculoskeletal:  Positive for back pain. Negative for arthralgias and myalgias.   Skin:  Negative for rash.   Neurological:  Negative for weakness and headaches.   Hematological:  Negative for adenopathy.   Psychiatric/Behavioral:  The patient is nervous/anxious.        Patient Active Problem List   Diagnosis   • History of ductal carcinoma in situ (DCIS) of breast   • Encounter for follow-up examination after completed treatment for malignant neoplasm     Past Medical History:   Diagnosis Date   • Breast cancer (HCC) 2020    [facility] dcis   • Ductal carcinoma in situ (DCIS) of right breast 07/15/2020     Past Surgical History:   Procedure Laterality Date   • BREAST BIOPSY Right 07/01/2020    stereo   • CHOLECYSTECTOMY     • COLONOSCOPY     • FOOT SURGERY     • MAMMO STEREOTACTIC BREAST BIOPSY RIGHT (ALL INC) Right 07/01/2020   • MASTECTOMY Right 08/27/2020   • MASTECTOMY W/ SENTINEL NODE BIOPSY Right 08/27/2020    Procedure: BREAST MASTECTOMY WITH SENTINEL LYMPH NODE BIOPSY, LYMPHATIC MAPPING WITH BLUE DYE AND RADIOACTIVE DYE (INJECT AT  0800 BY DR WOOD IN THE OR);  Surgeon: Chaparro Wood MD;  [facility];  Service: Surgical Oncology   • PA IMPLNT BIO IMPLNT FOR SOFT TISSUE REINFORCEMENT Right 08/27/2020    Procedure: BREAST RECONSTRUCTION WITH IMPLANT;  Surgeon: Donta Severino MD;  [facility];  Service: Plastics   • PA INSJ/RPLCMT BREAST IMPLANT SEP DAY MASTECTOMY Right 12/04/2020    Procedure: IMPLANT EXCHANGE;  Surgeon: Donta Severino MD;  [facility];  Service: Plastics   • PA CAREN-IMPLANT CAPSULECTOMY BREAST COMPLETE Right 12/04/2020    Procedure: CAPSULECTOMY;  Surgeon: Donta Severino MD;  [facility];  Service: Plastics   • PA TISSUE EXPANDER PLACEMENT BREAST RECONSTRUCTION Right 08/27/2020    Procedure: BREAST INSERTION/PLACEMENT TISSUE EXPANDER (EXCHANGE);  Surgeon: Donta Severino MD;  [facility];  Service: Plastics   • TUBAL LIGATION     • WISDOM TOOTH EXTRACTION       Family History   Problem Relation Age of Onset   • Endometrial cancer Mother 33   • Cancer Mother 53        Bladder   • No Known Problems Father    • No Known Problems Sister    • No Known Problems Daughter    • No Known Problems Maternal Grandmother    • No Known Problems Maternal Grandfather    • No Known Problems Paternal Grandmother    • No Known Problems Paternal Grandfather    • No Known Problems Brother    • No Known Problems Brother    • No Known Problems Son    • No Known Problems Maternal Aunt    • No Known Problems Maternal Aunt    • No Known Problems Maternal Aunt    • No Known Problems Maternal Uncle    • No Known Problems Maternal Uncle    • No Known Problems Maternal Uncle    • No Known Problems Maternal Uncle    • No Known Problems Maternal Uncle    • No Known Problems Maternal Uncle    • No Known Problems Paternal Aunt    • No Known Problems Paternal Aunt    • No Known Problems Paternal Aunt    • No Known Problems Paternal Uncle    • No Known Problems Paternal Uncle    • No Known Problems Paternal Uncle    • No Known Problems  Paternal Uncle    • No Known Problems Paternal Uncle    • No Known Problems Paternal Uncle      Social History     Socioeconomic History   • Marital status: Single     Spouse name: Not on file   • Number of children: Not on file   • Years of education: Not on file   • Highest education level: Not on file   Occupational History   • Not on file   Tobacco Use   • Smoking status: Never   • Smokeless tobacco: Never   Vaping Use   • Vaping status: Never Used   Substance and Sexual Activity   • Alcohol use: Not Currently   • Drug use: Never   • Sexual activity: Yes   Other Topics Concern   • Not on file   Social History Narrative   • Not on file     Social Determinants of Health     Financial Resource Strain: Not on file   Food Insecurity: Not on file   Transportation Needs: Not on file   Physical Activity: Not on file   Stress: Not on file   Social Connections: Not on file   Intimate Partner Violence: Not on file   Housing Stability: Not on file       Current Outpatient Medications:   •  CALCIUM PO, Take 1 tablet by mouth daily Unsure of dose, Disp: , Rfl:   •  Cholecalciferol (VITAMIN D3) 125 MCG (5000 UT) TABS, Take 5,000 Units by mouth daily, Disp: , Rfl:   •  Cyanocobalamin (B-12 PO), Take 1,000 mcg by mouth every other day , Disp: , Rfl:   •  Menaquinone-7 (VITAMIN K2 PO), Take by mouth daily, Disp: , Rfl:   •  NON FORMULARY, Take 1 tablet by mouth daily Glucose Support, Disp: , Rfl:   •  TURMERIC CURCUMIN PO, Take 1 capsule by mouth daily Unsure of dose, Disp: , Rfl:   Allergies   Allergen Reactions   • Codeine Vomiting   • Penicillins Hives     Vitals:    02/19/24 1129   BP: 126/74   Pulse: 102   Resp: 18   Temp: (!) 97.2 °F (36.2 °C)   SpO2: 100%       Physical Exam  Constitutional:       General: She is not in acute distress.     Appearance: Normal appearance.   Cardiovascular:      Rate and Rhythm: Normal rate and regular rhythm.      Pulses: Normal pulses.      Heart sounds: Normal heart sounds.   Pulmonary:       Effort: Pulmonary effort is normal.      Breath sounds: Normal breath sounds.   Chest:      Chest wall: No mass.   Breasts:     Right: No swelling, bleeding, mass, skin change or tenderness.      Left: No swelling, bleeding, inverted nipple, mass, nipple discharge, skin change or tenderness.          Comments: Right breast mastectomy with implant present  Abdominal:      General: Abdomen is flat.      Palpations: Abdomen is soft.   Lymphadenopathy:      Upper Body:      Right upper body: No supraclavicular, axillary or pectoral adenopathy.      Left upper body: No supraclavicular, axillary or pectoral adenopathy.   Skin:     General: Skin is warm.   Neurological:      General: No focal deficit present.      Mental Status: She is alert and oriented to person, place, and time.   Psychiatric:         Mood and Affect: Mood normal.         Behavior: Behavior normal.           Results:    Imaging  Echo complete w/ contrast if indicated    Result Date: 1/29/2024  Narrative: •  Left Ventricle: Left ventricular cavity size is normal. Wall thickness is normal. The left ventricular ejection fraction is 65%. Systolic function is normal. Wall motion is normal. Diastolic function is mildly abnormal, consistent with grade I (abnormal) relaxation. •  Right Ventricle: Right ventricular cavity size is normal. Systolic function is normal. Normal tricuspid annular plane systolic excursion (TAPSE) > 1.7 cm. •  Left Atrium: The atrium is normal in size. •  Right Atrium: The atrium is normal in size. •  Mitral Valve: There is moderate annular calcification. •  Prior TTE study available for comparison. Prior study date: 2/9/2023. No significant changes noted compared to the prior study.       I reviewed the above imaging data.      Advance Care Planning/Advance Directives:  Discussed disease status, cancer treatment plans and/or cancer treatment goals with the patient.

## 2024-02-27 ENCOUNTER — HOSPITAL ENCOUNTER (OUTPATIENT)
Dept: NUCLEAR MEDICINE | Facility: HOSPITAL | Age: 69
Discharge: HOME | End: 2024-02-27
Payer: COMMERCIAL

## 2024-02-27 DIAGNOSIS — M54.41 MIDLINE LOW BACK PAIN WITH RIGHT-SIDED SCIATICA, UNSPECIFIED CHRONICITY: ICD-10-CM

## 2024-02-27 DIAGNOSIS — Z86.000 HISTORY OF DUCTAL CARCINOMA IN SITU (DCIS) OF BREAST: ICD-10-CM

## 2024-02-27 PROCEDURE — A9503 TC99M MEDRONATE: HCPCS

## 2024-02-27 PROCEDURE — 78306 BONE IMAGING WHOLE BODY: CPT

## 2024-02-27 PROCEDURE — G1004 CDSM NDSC: HCPCS

## 2024-02-28 DIAGNOSIS — Z86.000 HISTORY OF DUCTAL CARCINOMA IN SITU (DCIS) OF BREAST: ICD-10-CM

## 2024-02-28 DIAGNOSIS — M25.519 NECK AND SHOULDER PAIN: Primary | ICD-10-CM

## 2024-02-28 DIAGNOSIS — N62 MACROMASTIA: ICD-10-CM

## 2024-02-28 DIAGNOSIS — M54.2 NECK AND SHOULDER PAIN: Primary | ICD-10-CM

## 2024-04-11 ENCOUNTER — OFFICE VISIT (OUTPATIENT)
Dept: PODIATRY | Facility: CLINIC | Age: 69
End: 2024-04-11
Payer: COMMERCIAL

## 2024-04-11 VITALS
SYSTOLIC BLOOD PRESSURE: 121 MMHG | BODY MASS INDEX: 24.19 KG/M2 | WEIGHT: 120 LBS | HEIGHT: 59 IN | HEART RATE: 89 BPM | DIASTOLIC BLOOD PRESSURE: 73 MMHG

## 2024-04-11 DIAGNOSIS — M79.672 LEFT FOOT PAIN: Primary | ICD-10-CM

## 2024-04-11 DIAGNOSIS — M77.9 TENDONITIS: ICD-10-CM

## 2024-04-11 PROCEDURE — 99203 OFFICE O/P NEW LOW 30 MIN: CPT | Performed by: PODIATRIST

## 2024-04-11 NOTE — PROGRESS NOTES
"Name: Sofiya Hogan      : 1955      MRN: [id number]  Encounter Provider: Michael Alex DPM  Encounter Date: 2024   Encounter department: [facility] PODIATRY [place]    Assessment & Plan     1. Left foot pain  -     XR foot 3+ vw left  -     Cam Boot    2. Tendonitis  -     Cam Boot        Tendonitis to the foot.  Cam Boot low.  Return in 4 weeks to see how she does.  If no improvement consider MRI.    Return in about 4 weeks (around 2024).    Subjective     Patient has left foot pain and has had issues for over 1 year.  She has pain that comes and goes.  Lately she has worse pain to the foot that goes up on top of the foot.  Patient has pain with inversion of the foot.  Has become worse over last 2 weeks.  Ice has helped.       Review of Systems   Constitutional:  Negative for chills and fever.   Respiratory:  Negative for chest tightness, shortness of breath and wheezing.    Cardiovascular:  Negative for chest pain and leg swelling.   Gastrointestinal:  Negative for diarrhea, nausea and vomiting.   Musculoskeletal:  Negative for joint swelling.   Skin:  Negative for color change and wound.   Neurological:  Negative for dizziness, weakness and numbness.   Hematological:  Does not bruise/bleed easily.   Psychiatric/Behavioral:  Negative for agitation.        Current Outpatient Medications on File Prior to Visit   Medication Sig   • CALCIUM PO Take 1 tablet by mouth daily Unsure of dose   • Cholecalciferol (VITAMIN D3) 125 MCG (5000 UT) TABS Take 5,000 Units by mouth daily   • Cyanocobalamin (B-12 PO) Take 1,000 mcg by mouth every other day    • Menaquinone-7 (VITAMIN K2 PO) Take by mouth daily   • NON FORMULARY Take 1 tablet by mouth daily Glucose Support   • TURMERIC CURCUMIN PO Take 1 capsule by mouth daily Unsure of dose       Objective     /73   Pulse 89   Ht 4' 11\" (1.499 m)   Wt 54.4 kg (120 lb)   BMI 24.24 kg/m²     Physical Exam  Constitutional:       Appearance: " Normal appearance.   Feet:      Comments: Vascular: Intact pedal pulses bilateral DP and PT.  Neurological: Gross protective sensation intact bilateral  Musculoskeletal: Muscle strength bilateral intact with dorsiflexion, inversion, eversion and plantarflexion.  Patient has tenderness on palpation of the dorsal aspect of the Left foot, there is no significant deficit in muscle strength on examination dorsiflexion plantarflexion inversion and eversion.  No discomfort with palpation at the level of the ankle.  Dermatological: No open lesions or ulcerations noted bilateral.    Neurological:      Mental Status: She is alert.

## 2024-05-15 ENCOUNTER — OFFICE VISIT (OUTPATIENT)
Dept: PODIATRY | Facility: CLINIC | Age: 69
End: 2024-05-15
Payer: COMMERCIAL

## 2024-05-15 VITALS
DIASTOLIC BLOOD PRESSURE: 85 MMHG | SYSTOLIC BLOOD PRESSURE: 133 MMHG | HEART RATE: 91 BPM | HEIGHT: 59 IN | WEIGHT: 116.8 LBS | BODY MASS INDEX: 23.55 KG/M2

## 2024-05-15 DIAGNOSIS — M79.672 LEFT FOOT PAIN: Primary | ICD-10-CM

## 2024-05-15 DIAGNOSIS — M77.9 TENDONITIS: ICD-10-CM

## 2024-05-15 PROCEDURE — 99213 OFFICE O/P EST LOW 20 MIN: CPT | Performed by: PODIATRIST

## 2024-05-15 NOTE — PATIENT INSTRUCTIONS
My recommendation for over-the-counter inserts for you are:    PowerStep Sandy Insoles    These can be obtained directly from the  at [email or web address]    You can also find these online at [employer], [employer] and other retailers.    There are various types within this line of orthotics.  PowerStep Sandy insoles.

## 2024-05-15 NOTE — PROGRESS NOTES
"Name: Sofiya Hogan      : 1955      MRN: [id number]  Encounter Provider: Michael Alex DPM  Encounter Date: 5/15/2024   Encounter department: [facility] PODIATRY [place]    Assessment & Plan     1. Left foot pain  -     Ambulatory referral to Physical Therapy; Future  2. Tendonitis  -     Ambulatory referral to Physical Therapy; Future      Patient had improvement in her symptoms from the last visit.    Will plan on checking patient back for reevaluation as needed.    No follow-ups on file.    Subjective     Patient has pain and discomfort that has improved dramatically with the boot to the left foot dorsal aspect.  She states that overall she has noticed a great improvement from the last visit.  Denies any new acute changes.      Review of Systems   Constitutional:  Negative for chills and fever.   Respiratory:  Negative for chest tightness and shortness of breath.    Gastrointestinal:  Negative for nausea and vomiting.       Current Outpatient Medications on File Prior to Visit   Medication Sig   • CALCIUM PO Take 1 tablet by mouth daily Unsure of dose   • Cholecalciferol (VITAMIN D3) 125 MCG (5000 UT) TABS Take 5,000 Units by mouth daily   • Cyanocobalamin (B-12 PO) Take 1,000 mcg by mouth every other day    • Menaquinone-7 (VITAMIN K2 PO) Take by mouth daily   • NON FORMULARY Take 1 tablet by mouth daily Glucose Support   • TURMERIC CURCUMIN PO Take 1 capsule by mouth daily Unsure of dose       Objective     /85   Pulse 91   Ht 4' 11\" (1.499 m)   Wt 53 kg (116 lb 12.8 oz)   BMI 23.59 kg/m²     Physical Exam  Constitutional:       Appearance: Normal appearance.   Feet:      Comments: Vascular: Intact pedal pulses bilateral DP and PT.  Neurological: Gross protective sensation intact bilateral  Musculoskeletal: Muscle strength bilateral intact with dorsiflexion, inversion, eversion and plantarflexion.  Pain to the dorsal aspect of the foot has improved dramatically from the previous " evaluation.  No other areas of acute discomfort or tenderness on examination.  No tenderness with range of motion of the foot or ankle.  Dermatological: No open lesions or ulcerations noted bilateral.    Neurological:      Mental Status: She is alert.

## 2024-05-22 ENCOUNTER — TELEPHONE (OUTPATIENT)
Age: 69
End: 2024-05-22

## 2024-05-22 NOTE — TELEPHONE ENCOUNTER
Caller: Sofiya Hogan    Doctor: Abi Moreno    Reason for call: The PT prescription is for an amputee.  Sofiya is not an amputee, she is going to PT for tendonitis.  The PT clinic asked that we remove the amputee status.  Can someone please do this?  Thank you.    Call back#: [phone]

## 2024-05-28 ENCOUNTER — EVALUATION (OUTPATIENT)
Dept: PHYSICAL THERAPY | Facility: REHABILITATION | Age: 69
End: 2024-05-28
Payer: COMMERCIAL

## 2024-05-28 DIAGNOSIS — M79.672 LEFT FOOT PAIN: ICD-10-CM

## 2024-05-28 DIAGNOSIS — M77.9 TENDONITIS: ICD-10-CM

## 2024-05-28 PROCEDURE — 97162 PT EVAL MOD COMPLEX 30 MIN: CPT | Performed by: PHYSICAL THERAPIST

## 2024-05-28 NOTE — PROGRESS NOTES
PT Evaluation     Today's date: 2024  Patient name: Sofiya Hogan  : 1955  MRN: [id number]  Referring provider: Michael Alex,*  Dx:   Encounter Diagnosis     ICD-10-CM    1. Left foot pain  M79.672 Ambulatory referral to Physical Therapy      2. Tendonitis  M77.9 Ambulatory referral to Physical Therapy          Start Time: 1035  Stop Time: 1110  Total time in clinic (min): 35 minutes    Assessment    Assessment details: Sofiya Hogan is a 68 y.o. female presenting to outpatient physical therapy on 24 with referral from MD for left foot pain that has been doing well over the last 2 weeks since getting OOB. Her overall foot assessment was unremarkable. Patient was educated to call if symptoms return and become persistent.Will follow up in 3 weeks unless symptoms remain improved.     Understanding of Dx/Px/POC: good     Prognosis: good    Plan  Patient would benefit from: skilled PT    Planned therapy interventions: patient education    Frequency: 2x week  Duration in weeks: 4  Treatment plan discussed with: patient        Subjective Evaluation    History of Present Illness  Mechanism of injury: Sofiya is a 68 y.o. female presenting to physical therapy on 24 with referral from MD for left foot pain that began a year ago. She reports that her pain was insidious onset, no GAVIN and no prior history of foot pain/injury. She denies any increase in activity with walking or standing leading up to injury.     She reports night pain, pain with moving her foot into inversion. She reports swelling at time when she sought out consult to podiatry.     She has no pain walking but would get some waking night pain.     She reports her pain was along the lateral foot and dorsum of her foot.     She walks daily, 3-4 miles.    PMH includes bunionectomy in 's without complications.             Recurrent probem    Quality of life: good    Patient Goals  Patient goals for therapy: decreased  pain    Pain  Current pain ratin  At worst pain ratin      Diagnostic Tests  X-ray: normal          Objective    Posture: normal  Palpation: normal             GAIT: normal    Singe Leg Stance: normal            MMT         AROM          PROM    Hip       L       R        L           R      L     R   ER.         G. Max         G. Med         Iliop.         .         Knee         Extension         Flexion                  Ankle         Dorsi Flexion 5 5       Plantar Flexion 4+ 4+       Inversion 5 5       Eversion 5 5                  Neuro Dynamic Testing:  Straight leg raise:   L= neg     R=     neg                        ANTT findings:       sural and peroneal tested     Segmental mobility:   WNL                       Precautions: standard       Manuals                                                                 Neuro Re-Ed                                                                                                        Ther Ex                                                                                                                     Ther Activity                                       Gait Training                                       Modalities

## 2024-06-05 ENCOUNTER — TELEPHONE (OUTPATIENT)
Dept: HEMATOLOGY ONCOLOGY | Facility: CLINIC | Age: 69
End: 2024-06-05

## 2024-06-05 NOTE — TELEPHONE ENCOUNTER
Appointment Change  Cancel, Reschedule, Change to Virtual      Who are you speaking with? Patient   If it is not the patient, is the caller listed on the communication consent form? N/A   Which provider is the appointment scheduled with? EDILIA Maldonado   When was the original appointment scheduled?    Please list date and time 6/18/24   At which location is the appointment scheduled to take place? [place]   Was the appointment rescheduled?     Was the appointment changed from an in person visit to a virtual visit?    If so, please list the details of the change. 8/26/24   What is the reason for the appointment change? Appt scheduled before her mammogram

## 2024-06-20 ENCOUNTER — HOSPITAL ENCOUNTER (OUTPATIENT)
Dept: MAMMOGRAPHY | Facility: CLINIC | Age: 69
Discharge: HOME | End: 2024-06-20
Payer: COMMERCIAL

## 2024-06-20 VITALS — HEIGHT: 59 IN | BODY MASS INDEX: 23.39 KG/M2 | WEIGHT: 116 LBS

## 2024-06-20 DIAGNOSIS — Z86.000 HISTORY OF DUCTAL CARCINOMA IN SITU (DCIS) OF BREAST: ICD-10-CM

## 2024-06-20 PROCEDURE — G0279 TOMOSYNTHESIS, MAMMO: HCPCS

## 2024-06-20 PROCEDURE — 77065 DX MAMMO INCL CAD UNI: CPT

## 2024-08-09 ENCOUNTER — TELEPHONE (OUTPATIENT)
Dept: SURGICAL ONCOLOGY | Facility: CLINIC | Age: 69
End: 2024-08-09

## 2024-08-09 PROBLEM — Z86.000 ENCOUNTER FOR FOLLOW-UP SURVEILLANCE OF DUCTAL CARCINOMA IN SITU OF BREAST: Status: ACTIVE | Noted: 2021-06-22

## 2024-08-09 NOTE — TELEPHONE ENCOUNTER
Called the patient to offer her a sooner follow up appointment with Dr. Wood on 8/14 at 10:00 which she accepted. The patient verified appointment details and was appreciative of the call.

## 2024-08-14 ENCOUNTER — HOSPITAL ENCOUNTER (OUTPATIENT)
Dept: RADIOLOGY | Facility: HOSPITAL | Age: 69
Discharge: HOME | End: 2024-08-14
Payer: COMMERCIAL

## 2024-08-14 ENCOUNTER — OFFICE VISIT (OUTPATIENT)
Dept: SURGICAL ONCOLOGY | Facility: CLINIC | Age: 69
End: 2024-08-14
Payer: COMMERCIAL

## 2024-08-14 VITALS
WEIGHT: 116.5 LBS | RESPIRATION RATE: 16 BRPM | HEIGHT: 59 IN | HEART RATE: 94 BPM | SYSTOLIC BLOOD PRESSURE: 136 MMHG | DIASTOLIC BLOOD PRESSURE: 100 MMHG | BODY MASS INDEX: 23.48 KG/M2 | TEMPERATURE: 97.7 F | OXYGEN SATURATION: 95 %

## 2024-08-14 DIAGNOSIS — R05.8 COUGH PRESENT FOR GREATER THAN 3 WEEKS: ICD-10-CM

## 2024-08-14 DIAGNOSIS — Z08 ENCOUNTER FOR FOLLOW-UP SURVEILLANCE OF DUCTAL CARCINOMA IN SITU OF BREAST: ICD-10-CM

## 2024-08-14 DIAGNOSIS — Z86.000 HISTORY OF DUCTAL CARCINOMA IN SITU (DCIS) OF BREAST: Primary | ICD-10-CM

## 2024-08-14 DIAGNOSIS — Z86.000 ENCOUNTER FOR FOLLOW-UP SURVEILLANCE OF DUCTAL CARCINOMA IN SITU OF BREAST: ICD-10-CM

## 2024-08-14 PROCEDURE — 71046 X-RAY EXAM CHEST 2 VIEWS: CPT

## 2024-08-14 PROCEDURE — 99213 OFFICE O/P EST LOW 20 MIN: CPT | Performed by: SURGERY

## 2024-08-14 NOTE — PROGRESS NOTES
Surgical Oncology Follow Up       [facility] SURGICAL ONCOLOGY [place]  [facility] [place] [address]    Sofiya Hogan  1955  [id number]  [facility]COLOGY [place]  [address] [facility]  [facility] [address]    Chief Complaint   Patient presents with    Follow-up          Assessment & Plan:   Patient presents for 6-month follow-up visit for her ductal carcinoma in situ.  She has had reconstruction she has intermittent pain which has been persistent for some time in the inframammary fold and the reconstructed breast.  Patient also complains of persistent cough.  There having her carpets replaced for this.  Also complains of back pain but has had a bone scan this year which showed no worrisome findings.  Recommended chest x-ray given the persistent cough.    Cancer History:     Oncology History   History of ductal carcinoma in situ (DCIS) of breast   7/1/2020 Biopsy    Right breast stereotactic biopsy  A. 10 o'clock, with calcifications  Ductal carcinoma in situ  Grade 1        B. 10 o'clock, without calcifications  Ductal carcinoma in situ   Grade 1    Concordant. Appears multifocal. Calcifications cover an area of potentially up to 6 cm in AP dimension. US of right axilla not performed. Left breast clear.     7/7/2020 -  Cancer Staged    Staging form: Breast, AJCC 8th Edition  - Pathologic stage from 7/7/2020: Stage 0 (pTis (DCIS), pN0(sn), cM0, G2, ER+, ME+, HER2: Not Assessed) - Signed by EDILIA Delgado on 2/19/2024  Stage prefix: Initial diagnosis  Method of lymph node assessment: [place] lymph node biopsy  Nuclear grade: G1  Multigene prognostic tests performed: None  Histologic grading system: 3 grade system  Laterality: Right  Tumor size (mm): 67       7/16/2020 Genetic Testing    Genetic testing done - [facility]  The following genes were evaluated: LAURA, BRCA1, BRCA2, CDH1, CHEK2,  PALB2, PTEN, STK11, TP53  Negative result. No pathogenic sequence variants or deletions/dupllications identified     8/27/2020 Surgery    Right breast mastectomy with sentinel lymph node biopsy  Ductal carcinoma in situ  Grade 2  6.7 cm  Margins negative  0/4 Lymph nodes  Stage 0    Immediate reconstruction with tissue expander (Dr. Severino)     10/1/2020 - 10/1/2020 Hormone Therapy    Consult with Dr. Myles  Hormonal therapy not recommended           Interval History:   See above, the patient has multiple relatively mild complaints however the persistent cough is somewhat concerning since she is going to relatively significant measures such as changing the carpet in her house.  She has not had a chest x-ray for approximately 2 years.    Review of Systems:   Review of Systems   Respiratory:  Positive for cough.    Musculoskeletal:  Positive for arthralgias and back pain.   All other systems reviewed and are negative.      Past Medical History     Patient Active Problem List   Diagnosis    History of ductal carcinoma in situ (DCIS) of breast    Encounter for follow-up surveillance of ductal carcinoma in situ of breast    Tendonitis    Left foot pain     Past Medical History:   Diagnosis Date    Breast cancer (HCC) 2020    [facility] dcis    Ductal carcinoma in situ (DCIS) of right breast 07/15/2020     Past Surgical History:   Procedure Laterality Date    BREAST BIOPSY Right 07/01/2020    stereo    CHOLECYSTECTOMY      COLONOSCOPY      FOOT SURGERY      MAMMO STEREOTACTIC BREAST BIOPSY RIGHT (ALL INC) Right 07/01/2020    MASTECTOMY Right 08/27/2020    MASTECTOMY W/ SENTINEL NODE BIOPSY Right 08/27/2020    Procedure: BREAST MASTECTOMY WITH SENTINEL LYMPH NODE BIOPSY, LYMPHATIC MAPPING WITH BLUE DYE AND RADIOACTIVE DYE (INJECT AT 0800 BY DR WOOD IN THE OR);  Surgeon: Chaparro Wood MD;  [facility];  Service: Surgical Oncology    CO IMPLNT BIO IMPLNT FOR SOFT TISSUE REINFORCEMENT Right 08/27/2020    Procedure: BREAST  RECONSTRUCTION WITH IMPLANT;  Surgeon: Donta Severino MD;  [facility];  Service: Plastics    MS INSJ/RPLCMT BREAST IMPLANT SEP DAY MASTECTOMY Right 12/04/2020    Procedure: IMPLANT EXCHANGE;  Surgeon: Donta Severino MD;  [facility];  Service: Plastics    MS CAREN-IMPLANT CAPSULECTOMY BREAST COMPLETE Right 12/04/2020    Procedure: CAPSULECTOMY;  Surgeon: Donta Severino MD;  [facility];  Service: Plastics    MS TISSUE EXPANDER PLACEMENT BREAST RECONSTRUCTION Right 08/27/2020    Procedure: BREAST INSERTION/PLACEMENT TISSUE EXPANDER (EXCHANGE);  Surgeon: Donta Severino MD;  [facility];  Service: Plastics    TUBAL LIGATION      WISDOM TOOTH EXTRACTION       Family History   Problem Relation Age of Onset    Endometrial cancer Mother 33    Cancer Mother 53        Bladder    No Known Problems Father     No Known Problems Sister     No Known Problems Daughter     No Known Problems Maternal Grandmother     No Known Problems Maternal Grandfather     No Known Problems Paternal Grandmother     No Known Problems Paternal Grandfather     No Known Problems Brother     No Known Problems Brother     No Known Problems Son     No Known Problems Maternal Aunt     No Known Problems Maternal Aunt     No Known Problems Maternal Aunt     No Known Problems Maternal Uncle     No Known Problems Maternal Uncle     No Known Problems Maternal Uncle     No Known Problems Maternal Uncle     No Known Problems Maternal Uncle     No Known Problems Maternal Uncle     No Known Problems Paternal Aunt     No Known Problems Paternal Aunt     No Known Problems Paternal Aunt     No Known Problems Paternal Uncle     No Known Problems Paternal Uncle     No Known Problems Paternal Uncle     No Known Problems Paternal Uncle     No Known Problems Paternal Uncle     No Known Problems Paternal Uncle      Social History     Socioeconomic History    Marital status: Single     Spouse name: Not on file    Number of children: Not on file    Years  of education: Not on file    Highest education level: Not on file   Occupational History    Not on file   Tobacco Use    Smoking status: Never    Smokeless tobacco: Never   Vaping Use    Vaping status: Never Used   Substance and Sexual Activity    Alcohol use: Not Currently    Drug use: Never    Sexual activity: Yes   Other Topics Concern    Not on file   Social History Narrative    Not on file     Social Determinants of Health     Financial Resource Strain: Not on file   Food Insecurity: Not on file   Transportation Needs: Not on file   Physical Activity: Not on file   Stress: Not on file   Social Connections: Not on file   Intimate Partner Violence: Not on file   Housing Stability: Not on file       Current Outpatient Medications:     CALCIUM PO, Take 1 tablet by mouth daily Unsure of dose, Disp: , Rfl:     Cholecalciferol (VITAMIN D3) 125 MCG (5000 UT) TABS, Take 5,000 Units by mouth daily, Disp: , Rfl:     Cyanocobalamin (B-12 PO), Take 1,000 mcg by mouth every other day , Disp: , Rfl:     Menaquinone-7 (VITAMIN K2 PO), Take by mouth daily, Disp: , Rfl:     NON FORMULARY, Take 1 tablet by mouth daily Glucose Support, Disp: , Rfl:     TURMERIC CURCUMIN PO, Take 1 capsule by mouth daily Unsure of dose, Disp: , Rfl:   Allergies   Allergen Reactions    Codeine Vomiting    Penicillins Hives       Physical Exam:     Vitals:    08/14/24 1001   BP: 136/100   Pulse: 94   Resp: 16   Temp: 97.7 °F (36.5 °C)   SpO2: 95%     Physical Exam  Vitals reviewed.   Constitutional:       Appearance: She is well-developed.   HENT:      Head: Normocephalic and atraumatic.   Eyes:      Pupils: Pupils are equal, round, and reactive to light.   Neck:      Thyroid: No thyromegaly.      Vascular: No JVD.      Trachea: No tracheal deviation.   Cardiovascular:      Rate and Rhythm: Normal rate and regular rhythm.      Heart sounds: Normal heart sounds. No murmur heard.     No friction rub. No gallop.   Pulmonary:      Effort: Pulmonary  effort is normal. No respiratory distress.      Breath sounds: Normal breath sounds. No wheezing or rales.   Chest:          Comments: Examination of the right reconstructed breast demonstrates no worrisome skin findings dominant masses or axillary adenopathy.    The left breast was examined in the sitting and supine position.  There are no worrisome skin changes, tenderness, inverted nipple, nipple discharge, swelling, bleeding or evidence of a mass in any quadrant.  Lisa survey demonstrated no evidence of any clinically suspicious axillary, pectoral or paraclavicular lymph nodes.  Abdominal:      General: There is no distension.      Palpations: Abdomen is soft. There is no hepatomegaly or mass.      Tenderness: There is no abdominal tenderness. There is no guarding or rebound.   Musculoskeletal:         General: No tenderness. Normal range of motion.      Cervical back: Normal range of motion and neck supple.   Lymphadenopathy:      Cervical: No cervical adenopathy.   Skin:     General: Skin is warm and dry.      Findings: No erythema or rash.   Neurological:      Mental Status: She is alert and oriented to person, place, and time.      Cranial Nerves: No cranial nerve deficit.   Psychiatric:         Behavior: Behavior normal.           Results & Discussion:   The patient is free of any evidence of local regional or distant recurrent disease with the exception of her persistent cough.  I recommended a chest x-ray I think this is highly unlikely related to her DCIS, does seem to be troubling her considerably.  Will see her back in 6 months.  She is agreeable to see our nurse practitioner at that time          Advance Care Planning/Advance Directives:  I discussed the disease status, treatment plans and follow-up with the patient.

## 2024-10-25 ENCOUNTER — TRANSCRIBE ORDERS (OUTPATIENT)
Dept: LAB | Facility: CLINIC | Age: 69
End: 2024-10-25

## 2024-10-25 ENCOUNTER — APPOINTMENT (OUTPATIENT)
Dept: LAB | Facility: CLINIC | Age: 69
End: 2024-10-25
Payer: COMMERCIAL

## 2024-10-25 DIAGNOSIS — J30.89 NON-SEASONAL ALLERGIC RHINITIS, UNSPECIFIED TRIGGER: ICD-10-CM

## 2024-10-25 DIAGNOSIS — J30.89 NON-SEASONAL ALLERGIC RHINITIS, UNSPECIFIED TRIGGER: Primary | ICD-10-CM

## 2024-10-25 PROCEDURE — 36415 COLL VENOUS BLD VENIPUNCTURE: CPT

## 2024-10-25 PROCEDURE — 86003 ALLG SPEC IGE CRUDE XTRC EA: CPT

## 2024-10-25 PROCEDURE — 82785 ASSAY OF IGE: CPT

## 2024-10-28 LAB
A ALTERNATA IGE QN: <0.1 KUA/I
A FUMIGATUS IGE QN: <0.1 KUA/I
ALMOND IGE QN: <0.1 KUA/I
BERMUDA GRASS IGE QN: <0.1 KUA/I
BOXELDER IGE QN: <0.1 KUA/I
C HERBARUM IGE QN: <0.1 KUA/I
CASHEW NUT IGE QN: <0.1 KUA/I
CAT DANDER IGE QN: 0.82 KUA/I
CMN PIGWEED IGE QN: <0.1 KUA/I
CODFISH IGE QN: <0.1 KUA/I
COMMON RAGWEED IGE QN: <0.1 KUA/I
COTTONWOOD IGE QN: <0.1 KUA/I
D FARINAE IGE QN: <0.1 KUA/I
D PTERONYSS IGE QN: <0.1 KUA/I
DOG DANDER IGE QN: 0.17 KUA/I
EGG WHITE IGE QN: <0.1 KUA/I
GLUTEN IGE QN: <0.1 KUA/I
HAZELNUT IGE QN: <0.1 KUA/L
LONDON PLANE IGE QN: <0.1 KUA/I
MILK IGE QN: <0.1 KUA/I
MOUSE URINE PROT IGE QN: <0.1 KUA/I
MT JUNIPER IGE QN: <0.1 KUA/I
MUGWORT IGE QN: <0.1 KUA/I
P NOTATUM IGE QN: <0.1 KUA/I
PEANUT IGE QN: <0.1 KUA/I
ROACH IGE QN: <0.1 KUA/I
SALMON IGE QN: <0.1 KUA/I
SCALLOP IGE QN: <0.1 KUA/L
SESAME SEED IGE QN: <0.1 KUA/I
SHEEP SORREL IGE QN: <0.1 KUA/I
SHRIMP IGE QN: <0.1 KUA/L
SILVER BIRCH IGE QN: <0.1 KUA/I
SOYBEAN IGE QN: <0.1 KUA/I
TIMOTHY IGE QN: <0.1 KUA/I
TOTAL IGE SMQN RAST: 67.3 KU/L (ref 0–113)
TOTAL IGE SMQN RAST: 78.3 KU/L (ref 0–113)
TUNA IGE QN: <0.1 KUA/I
WALNUT IGE QN: 0.14 KUA/I
WALNUT IGE QN: <0.1 KUA/I
WHEAT IGE QN: <0.1 KUA/I
WHITE ASH IGE QN: 0.5 KUA/I
WHITE ELM IGE QN: <0.1 KUA/I
WHITE MULBERRY IGE QN: <0.1 KUA/I
WHITE OAK IGE QN: <0.1 KUA/I

## 2025-02-03 ENCOUNTER — OFFICE VISIT (OUTPATIENT)
Dept: SURGICAL ONCOLOGY | Facility: CLINIC | Age: 70
End: 2025-02-03
Payer: COMMERCIAL

## 2025-02-03 VITALS
SYSTOLIC BLOOD PRESSURE: 132 MMHG | RESPIRATION RATE: 16 BRPM | DIASTOLIC BLOOD PRESSURE: 80 MMHG | HEIGHT: 59 IN | HEART RATE: 90 BPM | TEMPERATURE: 97.7 F | WEIGHT: 119 LBS | BODY MASS INDEX: 23.99 KG/M2 | OXYGEN SATURATION: 99 %

## 2025-02-03 DIAGNOSIS — M54.9 MID BACK PAIN ON LEFT SIDE: ICD-10-CM

## 2025-02-03 DIAGNOSIS — Z86.000 HISTORY OF DUCTAL CARCINOMA IN SITU (DCIS) OF BREAST: ICD-10-CM

## 2025-02-03 DIAGNOSIS — Z08 ENCOUNTER FOR FOLLOW-UP SURVEILLANCE OF DUCTAL CARCINOMA IN SITU OF BREAST: Primary | ICD-10-CM

## 2025-02-03 DIAGNOSIS — Z86.000 ENCOUNTER FOR FOLLOW-UP SURVEILLANCE OF DUCTAL CARCINOMA IN SITU OF BREAST: Primary | ICD-10-CM

## 2025-02-03 DIAGNOSIS — Z12.31 VISIT FOR SCREENING MAMMOGRAM: ICD-10-CM

## 2025-02-03 DIAGNOSIS — R05.3 CHRONIC COUGH: ICD-10-CM

## 2025-02-03 PROCEDURE — 99213 OFFICE O/P EST LOW 20 MIN: CPT

## 2025-02-03 NOTE — ASSESSMENT & PLAN NOTE
Patient is a 69-year-old female presenting today for a follow-up for right breast cancer diagnosed in July 2020. Pathology revealed multifocal DCIS ER/%. She underwent genetic testing which was negative. She had a right breast mastectomy with sentinel node biopsy with Dr. Wood and immediate reconstruction with Dr. Severino. Hormonal therapy was not recommended. She has been on observation. She had a dx mammo of the left breast on 6/20/24 which was BIRADS 1 category 3 density. There were no concerns on her breast exam. Patient mentions left side back pain around ribs 6 and 7. This has been persistent > 1 year. Bone scan and chest xray were negative over the past year. There is no MSK tenderness. She mentions a chronic cough. I am going to order a CT chest/abdomen at this time. Patient denies changes on her breast exam. She denies persistent headaches, shortness of breath, or abdominal pain. I will call her with the results. I will see the patient back in 6 months or sooner should the need arise. She was instructed to call with any questions or concerns prior to this time. All questions were answered today.   -  1 year f/u

## 2025-02-03 NOTE — PROGRESS NOTES
Name: Sofiya Hogan      : 1955      MRN: [id number]  Encounter Provider: EDILIA Delgado  Encounter Date: 2/3/2025   Encounter department: [facility] [place]  :  Assessment & Plan  Encounter for follow-up surveillance of ductal carcinoma in situ of breast  - 6 mo f/u       History of ductal carcinoma in situ (DCIS) of breast  Patient is a 69-year-old female presenting today for a follow-up for right breast cancer diagnosed in 2020. Pathology revealed multifocal DCIS ER/%. She underwent genetic testing which was negative. She had a right breast mastectomy with sentinel node biopsy with Dr. Wood and immediate reconstruction with Dr. Severino. Hormonal therapy was not recommended. She has been on observation. She had a dx mammo of the left breast on 24 which was BIRADS 1 category 3 density. There were no concerns on her breast exam. Patient mentions left side back pain around ribs 6 and 7. This has been persistent > 1 year. Bone scan and chest xray were negative over the past year. There is no MSK tenderness. She mentions a chronic cough. I am going to order a CT chest/abdomen at this time. Patient denies changes on her breast exam. She denies persistent headaches, shortness of breath, or abdominal pain. I will call her with the results. I will see the patient back in 6 months or sooner should the need arise. She was instructed to call with any questions or concerns prior to this time. All questions were answered today.   -  1 year f/u       Visit for screening mammogram  Orders:  •  Mammo screening left w 3d and cad; Future    Mid back pain on left side  Orders:  •  CT chest and abdomen w contrast; Future    Chronic cough  Orders:  •  CT chest and abdomen w contrast; Future        Oncology History   Cancer Staging   History of ductal carcinoma in situ (DCIS) of breast  Staging form: Breast, AJCC 8th Edition  - Pathologic stage from 2020: Stage 0  (pTis (DCIS), pN0(sn), cM0, G2, ER+, NH+, HER2: Not Assessed) - Signed by EDILIA Delgado on 2/19/2024  Stage prefix: Initial diagnosis  Method of lymph node assessment: [place] lymph node biopsy  Nuclear grade: G1  Multigene prognostic tests performed: None  Histologic grading system: 3 grade system  Laterality: Right  Tumor size (mm): 67  Oncology History   History of ductal carcinoma in situ (DCIS) of breast   7/1/2020 Biopsy    Right breast stereotactic biopsy  A. 10 o'clock, with calcifications  Ductal carcinoma in situ  Grade 1        B. 10 o'clock, without calcifications  Ductal carcinoma in situ   Grade 1    Concordant. Appears multifocal. Calcifications cover an area of potentially up to 6 cm in AP dimension. US of right axilla not performed. Left breast clear.     7/7/2020 -  Cancer Staged    Staging form: Breast, AJCC 8th Edition  - Pathologic stage from 7/7/2020: Stage 0 (pTis (DCIS), pN0(sn), cM0, G2, ER+, NH+, HER2: Not Assessed) - Signed by EDILIA Delgado on 2/19/2024  Stage prefix: Initial diagnosis  Method of lymph node assessment: [place] lymph node biopsy  Nuclear grade: G1  Multigene prognostic tests performed: None  Histologic grading system: 3 grade system  Laterality: Right  Tumor size (mm): 67       7/16/2020 Genetic Testing    Genetic testing done - [facility]  The following genes were evaluated: LAURA, BRCA1, BRCA2, CDH1, CHEK2, PALB2, PTEN, STK11, TP53  Negative result. No pathogenic sequence variants or deletions/dupllications identified     8/27/2020 Surgery    Right breast mastectomy with sentinel lymph node biopsy  Ductal carcinoma in situ  Grade 2  6.7 cm  Margins negative  0/4 Lymph nodes  Stage 0    Immediate reconstruction with tissue expander (Dr. Severino)     10/1/2020 - 10/1/2020 Hormone Therapy    Consult with Dr. Myles  Hormonal therapy not recommended        Review of Systems   Constitutional:  Negative for activity change, appetite  "change, fatigue and unexpected weight change.   Respiratory:  Positive for cough. Negative for shortness of breath.    Cardiovascular:  Negative for chest pain.   Gastrointestinal:  Negative for abdominal pain, diarrhea, nausea and vomiting.   Endocrine: Negative for heat intolerance.   Musculoskeletal:  Positive for back pain. Negative for arthralgias and myalgias.   Skin:  Negative for rash.   Neurological:  Negative for weakness and headaches.   Hematological:  Negative for adenopathy.    A complete review of systems is negative other than that noted above in the HPI.           Objective   /80 (Patient Position: Sitting, Cuff Size: Standard)   Pulse 90   Temp 97.7 °F (36.5 °C) (Temporal)   Resp 16   Ht 4' 11\" (1.499 m)   Wt 54 kg (119 lb)   SpO2 99%   BMI 24.04 kg/m²     Pain Screening:     ECOG    Physical Exam  Constitutional:       General: She is not in acute distress.     Appearance: Normal appearance.   Cardiovascular:      Rate and Rhythm: Normal rate and regular rhythm.      Pulses: Normal pulses.      Heart sounds: Normal heart sounds.   Pulmonary:      Effort: Pulmonary effort is normal.      Breath sounds: Normal breath sounds.   Chest:      Chest wall: No mass.   Breasts:     Right: No swelling, bleeding, mass, skin change or tenderness.      Left: No swelling, bleeding, inverted nipple, mass, nipple discharge, skin change or tenderness.          Comments: Right breast mastectomy with implants     Pain on lateral left chest wall/back   Abdominal:      General: Abdomen is flat.      Palpations: Abdomen is soft.   Lymphadenopathy:      Upper Body:      Right upper body: No supraclavicular, axillary or pectoral adenopathy.      Left upper body: No supraclavicular, axillary or pectoral adenopathy.   Skin:     General: Skin is warm.   Neurological:      General: No focal deficit present.      Mental Status: She is alert and oriented to person, place, and time.   Psychiatric:         Mood and " Affect: Mood normal.         Behavior: Behavior normal.          Labs: I have reviewed pertinent labs.   No visits with results within 1 Month(s) from this visit.   Latest known visit with results is:   Appointment on 10/25/2024   Component Date Value Ref Range Status   • Fish Cod 10/25/2024 <0.10  0.00 - 0.09 kUA/I Final   • Egg White 10/25/2024 <0.10  0.00 - 0.09 kUA/I Final   • Gluten 10/25/2024 <0.10  0.00 - 0.09 kUA/I Final   • Milk, Cow's 10/25/2024 <0.10  0.00 - 0.09 kUA/I Final   • Peanut 10/25/2024 <0.10  0.00 - 0.09 kUA/I Final   • [place] 10/25/2024 <0.10  0.00 - 0.09 kUA/I Final   • Scallop IgE 10/25/2024 <0.10  0.00 - 0.09 kUA/l Final   • Sesame Seed IgE 10/25/2024 <0.10  0.00 - 0.09 kUA/I Final   • Shrimp 10/25/2024 <0.10  0.00 - 0.09 kUA/l Final   • SOYBEAN 10/25/2024 <0.10  0.00 - 0.09 kUA/I Final   • Tuna IgE 10/25/2024 <0.10  0.00 - 0.09 kUA/I Final   • [place] 10/25/2024 <0.10  0.00 - 0.09 kUA/I Final   • Wheat 10/25/2024 <0.10  0.00 - 0.09 kUA/I Final   • Almonds 10/25/2024 <0.10  0.00 - 0.09 kUA/I Final   • Cashew 10/25/2024 <0.10  0.00 - 0.09 kUA/I Final   • Hazelnut 10/25/2024 <0.10  0.00 - 0.09 kUA/l Final   • IgE 10/25/2024 67.3  0 - 113 kU/l Final   • A.ALTERNATA 10/25/2024 <0.10  0.00 - 0.09 kUA/I Final   • A.FUMIGATUS 10/25/2024 <0.10  0.00 - 0.09 kUA/I Final   • B[place] Grass 10/25/2024 <0.10  0.00 - 0.09 kUA/I Final   • Box Elder  10/25/2024 <0.10  0.00 - 0.09 kUA/I Final   • Cat Epithellium-Dander 10/25/2024 0.82 (H)  0.00 - 0.09 kUA/I Final   • C.HERBARUM 10/25/2024 <0.10  0.00 - 0.09 kUA/I Final   • Cockroach 10/25/2024 <0.10  0.00 - 0.09 kUA/I Final   • Common Silver Birch 10/25/2024 <0.10  0.00 - 0.09 kUA/I Final   • [place] 10/25/2024 <0.10  0.00 - 0.09 kUA/I Final   • D. farinae 10/25/2024 <0.10  0.00 - 0.09 kUA/I Final   • D. pteronyssinus 10/25/2024 <0.10  0.00 - 0.09 kUA/I Final   • Dog Dander 10/25/2024 0.17 (H)  0.00 - 0.09 kUA/I Final   • Elm IgE 10/25/2024 <0.10  0.00 -  0.09 kUA/I Final   • Mountain Clinch Tree 10/25/2024 <0.10  0.00 - 0.09 kUA/I Final   • Mugwort 10/25/2024 <0.10  0.00 - 0.09 kUA/I Final   • [place] Tree 10/25/2024 <0.10  0.00 - 0.09 kUA/I Final   • Oak 10/25/2024 <0.10  0.00 - 0.09 kUA/I Final   • P.CHRYSOGENUM 10/25/2024 <0.10  0.00 - 0.09 kUA/I Final   • Rough Pigweed  IgE 10/25/2024 <0.10  0.00 - 0.09 kUA/I Final   • Common Ragweed 10/25/2024 <0.10  0.00 - 0.09 kUA/I Final   • Sheep Sorrel IgE 10/25/2024 <0.10  0.00 - 0.09 kUA/I Final   • [place] Tree 10/25/2024 <0.10  0.00 - 0.09 kUA/I Final   • [place] Grass 10/25/2024 <0.10  0.00 - 0.09 kUA/I Final   • [place] Tree 10/25/2024 0.14 (H)  0.00 - 0.09 kUA/I Final   • White [place] Tree 10/25/2024 0.50 (H)  0.00 - 0.09 kUA/I Final   • IgE 10/25/2024 78.3  0 - 113 kU/l Final   • MOUSE URINE 10/25/2024 <0.10  0.00 - 0.09 kUA/I Final

## 2025-02-06 ENCOUNTER — TELEPHONE (OUTPATIENT)
Dept: HEMATOLOGY ONCOLOGY | Facility: CLINIC | Age: 70
End: 2025-02-06

## 2025-02-06 ENCOUNTER — TELEPHONE (OUTPATIENT)
Age: 70
End: 2025-02-06

## 2025-02-06 DIAGNOSIS — R05.3 CHRONIC COUGH: Primary | ICD-10-CM

## 2025-02-06 NOTE — TELEPHONE ENCOUNTER
Called patient to let her know that labs orders are now in the system. She needs to get them done prior to having the CT on 2/14/25.

## 2025-02-06 NOTE — TELEPHONE ENCOUNTER
Najma from Radiology called in regards to pt ct on 2/14 and she needs to have updated bloodwork. Please place order and call pt to have that done prior to ct. Thank you

## 2025-02-07 ENCOUNTER — APPOINTMENT (OUTPATIENT)
Dept: LAB | Facility: CLINIC | Age: 70
End: 2025-02-07
Payer: COMMERCIAL

## 2025-02-07 DIAGNOSIS — R05.3 CHRONIC COUGH: ICD-10-CM

## 2025-02-07 LAB
BUN SERPL-MCNC: 15 MG/DL (ref 5–25)
CREAT SERPL-MCNC: 0.92 MG/DL (ref 0.6–1.3)
GFR SERPL CREATININE-BSD FRML MDRD: 62 ML/MIN/1.73SQ M

## 2025-02-07 PROCEDURE — 84520 ASSAY OF UREA NITROGEN: CPT

## 2025-02-07 PROCEDURE — 82565 ASSAY OF CREATININE: CPT

## 2025-02-07 PROCEDURE — 36415 COLL VENOUS BLD VENIPUNCTURE: CPT

## 2025-02-14 ENCOUNTER — HOSPITAL ENCOUNTER (OUTPATIENT)
Dept: CT IMAGING | Facility: HOSPITAL | Age: 70
Discharge: HOME | End: 2025-02-14
Payer: COMMERCIAL

## 2025-02-14 DIAGNOSIS — R05.3 CHRONIC COUGH: ICD-10-CM

## 2025-02-14 DIAGNOSIS — M54.9 MID BACK PAIN ON LEFT SIDE: ICD-10-CM

## 2025-02-14 PROCEDURE — 74160 CT ABDOMEN W/CONTRAST: CPT

## 2025-02-14 PROCEDURE — 71260 CT THORAX DX C+: CPT

## 2025-02-14 RX ADMIN — IOHEXOL 85 ML: 350 INJECTION, SOLUTION INTRAVENOUS at 14:59

## 2025-02-17 ENCOUNTER — TELEPHONE (OUTPATIENT)
Dept: HEMATOLOGY ONCOLOGY | Facility: CLINIC | Age: 70
End: 2025-02-17

## 2025-02-17 ENCOUNTER — RESULTS FOLLOW-UP (OUTPATIENT)
Dept: SURGICAL ONCOLOGY | Facility: CLINIC | Age: 70
End: 2025-02-17

## 2025-02-17 DIAGNOSIS — R91.8 LUNG NODULES: ICD-10-CM

## 2025-02-17 DIAGNOSIS — Z86.000 HISTORY OF DUCTAL CARCINOMA IN SITU (DCIS) OF BREAST: Primary | ICD-10-CM

## 2025-02-17 DIAGNOSIS — R91.8 LUNG NODULES: Primary | ICD-10-CM

## 2025-02-17 NOTE — TELEPHONE ENCOUNTER
Confirmed with patient appt. On 5/14/25 for CT chest and that labs will need to be done prior to the test. Asking Chrissy Garcia to please order labs for patient.

## 2025-02-17 NOTE — TELEPHONE ENCOUNTER
Called patient to discuss CT c/a/p. 3 month f/u CT chest recommended for small lung nodules. I reviewed that she has a 9 cm lipoma on the left subscapularis which might be contributing to her discomfort but it is hard to say. I informed her that she may see general surgery if she wants it removed given the size. She will think about it. All questions were answered.

## 2025-03-03 ENCOUNTER — TELEPHONE (OUTPATIENT)
Age: 70
End: 2025-03-03

## 2025-03-03 NOTE — TELEPHONE ENCOUNTER
Patient calling in, stated she thought that she could delay surgery but has realized that she cannot, she would like to discuss options and her recommendations for surgery. [facility] #[phone]

## 2025-03-04 DIAGNOSIS — D17.1 LIPOMA OF TORSO: Primary | ICD-10-CM

## 2025-03-04 NOTE — TELEPHONE ENCOUNTER
Called patient to inform her that referral was placed to general surgery by EDILIA Maldonado, and she should expect a call from their scheduling team to be scheduled for a consult.    Patient expressed understanding. No questions or concerns at this time.

## 2025-03-10 ENCOUNTER — CONSULT (OUTPATIENT)
Dept: SURGERY | Facility: CLINIC | Age: 70
End: 2025-03-10
Payer: COMMERCIAL

## 2025-03-10 ENCOUNTER — PREP FOR PROCEDURE (OUTPATIENT)
Dept: ENDOCRINOLOGY | Facility: CLINIC | Age: 70
End: 2025-03-10

## 2025-03-10 ENCOUNTER — APPOINTMENT (OUTPATIENT)
Dept: LAB | Facility: CLINIC | Age: 70
End: 2025-03-10
Payer: COMMERCIAL

## 2025-03-10 VITALS
HEART RATE: 88 BPM | OXYGEN SATURATION: 97 % | HEIGHT: 59 IN | WEIGHT: 119 LBS | SYSTOLIC BLOOD PRESSURE: 114 MMHG | BODY MASS INDEX: 23.99 KG/M2 | DIASTOLIC BLOOD PRESSURE: 78 MMHG | TEMPERATURE: 98.5 F

## 2025-03-10 DIAGNOSIS — R22.2 MASS OF LEFT CHEST WALL: Primary | ICD-10-CM

## 2025-03-10 DIAGNOSIS — Z01.818 ENCOUNTER FOR PREADMISSION TESTING: Primary | ICD-10-CM

## 2025-03-10 DIAGNOSIS — Z01.818 ENCOUNTER FOR PREADMISSION TESTING: ICD-10-CM

## 2025-03-10 DIAGNOSIS — D17.1 LIPOMA OF TORSO: ICD-10-CM

## 2025-03-10 PROBLEM — Z08 ENCOUNTER FOR FOLLOW-UP SURVEILLANCE OF DUCTAL CARCINOMA IN SITU OF BREAST: Status: RESOLVED | Noted: 2021-06-22 | Resolved: 2025-03-10

## 2025-03-10 PROBLEM — Z86.000 ENCOUNTER FOR FOLLOW-UP SURVEILLANCE OF DUCTAL CARCINOMA IN SITU OF BREAST: Status: RESOLVED | Noted: 2021-06-22 | Resolved: 2025-03-10

## 2025-03-10 LAB
ALBUMIN SERPL BCG-MCNC: 4.7 G/DL (ref 3.5–5)
ALP SERPL-CCNC: 78 U/L (ref 34–104)
ALT SERPL W P-5'-P-CCNC: 17 U/L (ref 7–52)
ANION GAP SERPL CALCULATED.3IONS-SCNC: 8 MMOL/L (ref 4–13)
AST SERPL W P-5'-P-CCNC: 20 U/L (ref 13–39)
BASOPHILS # BLD AUTO: 0.06 THOUSANDS/ÂΜL (ref 0–0.1)
BASOPHILS NFR BLD AUTO: 1 % (ref 0–1)
BILIRUB SERPL-MCNC: 0.55 MG/DL (ref 0.2–1)
BUN SERPL-MCNC: 18 MG/DL (ref 5–25)
CALCIUM SERPL-MCNC: 10.2 MG/DL (ref 8.4–10.2)
CHLORIDE SERPL-SCNC: 103 MMOL/L (ref 96–108)
CO2 SERPL-SCNC: 26 MMOL/L (ref 21–32)
CREAT SERPL-MCNC: 0.92 MG/DL (ref 0.6–1.3)
EOSINOPHIL # BLD AUTO: 0.08 THOUSAND/ÂΜL (ref 0–0.61)
EOSINOPHIL NFR BLD AUTO: 1 % (ref 0–6)
ERYTHROCYTE [DISTWIDTH] IN BLOOD BY AUTOMATED COUNT: 13.2 % (ref 11.6–15.1)
GFR SERPL CREATININE-BSD FRML MDRD: 62 ML/MIN/1.73SQ M
GLUCOSE P FAST SERPL-MCNC: 85 MG/DL (ref 65–99)
HCT VFR BLD AUTO: 42.9 % (ref 34.8–46.1)
HGB BLD-MCNC: 13.9 G/DL (ref 11.5–15.4)
IMM GRANULOCYTES # BLD AUTO: 0.02 THOUSAND/UL (ref 0–0.2)
IMM GRANULOCYTES NFR BLD AUTO: 0 % (ref 0–2)
LYMPHOCYTES # BLD AUTO: 2.35 THOUSANDS/ÂΜL (ref 0.6–4.47)
LYMPHOCYTES NFR BLD AUTO: 34 % (ref 14–44)
MCH RBC QN AUTO: 27.7 PG (ref 26.8–34.3)
MCHC RBC AUTO-ENTMCNC: 32.4 G/DL (ref 31.4–37.4)
MCV RBC AUTO: 86 FL (ref 82–98)
MONOCYTES # BLD AUTO: 0.5 THOUSAND/ÂΜL (ref 0.17–1.22)
MONOCYTES NFR BLD AUTO: 7 % (ref 4–12)
NEUTROPHILS # BLD AUTO: 4 THOUSANDS/ÂΜL (ref 1.85–7.62)
NEUTS SEG NFR BLD AUTO: 57 % (ref 43–75)
NRBC BLD AUTO-RTO: 0 /100 WBCS
PLATELET # BLD AUTO: 372 THOUSANDS/UL (ref 149–390)
PMV BLD AUTO: 10.4 FL (ref 8.9–12.7)
POTASSIUM SERPL-SCNC: 4.6 MMOL/L (ref 3.5–5.3)
PROT SERPL-MCNC: 8.1 G/DL (ref 6.4–8.4)
RBC # BLD AUTO: 5.02 MILLION/UL (ref 3.81–5.12)
SODIUM SERPL-SCNC: 137 MMOL/L (ref 135–147)
WBC # BLD AUTO: 7.01 THOUSAND/UL (ref 4.31–10.16)

## 2025-03-10 PROCEDURE — 36415 COLL VENOUS BLD VENIPUNCTURE: CPT

## 2025-03-10 PROCEDURE — 99203 OFFICE O/P NEW LOW 30 MIN: CPT | Performed by: SURGERY

## 2025-03-10 PROCEDURE — 80053 COMPREHEN METABOLIC PANEL: CPT

## 2025-03-10 PROCEDURE — 85025 COMPLETE CBC W/AUTO DIFF WBC: CPT

## 2025-03-10 PROCEDURE — 93005 ELECTROCARDIOGRAM TRACING: CPT

## 2025-03-10 RX ORDER — UBIDECARENONE 30 MG
30 CAPSULE ORAL DAILY
COMMUNITY

## 2025-03-10 RX ORDER — FLUTICASONE PROPIONATE 50 MCG
1 SPRAY, SUSPENSION (ML) NASAL DAILY
COMMUNITY
End: 2025-03-14

## 2025-03-10 NOTE — PROGRESS NOTES
"Name: Sofiya Hogan      : 1955      MRN: [id number]  Encounter Provider:  Tapan Cabral MD  Encounter Date: 3/10/2025   Encounter department: [facility] GENERAL SURGERY [place]  :  Assessment & Plan  Lipoma of torso    Orders:    Ambulatory Referral to General Surgery    Mass of left chest wall       Patient has a mass on the left chest wall in the subscapular area.  Patient is being scheduled for excision of the mass in the left chest wall under general anesthesia.  Procedure will be done in the right lateral decubitus position.    Procedure discussed with patient and informed consent obtained.  Risks include bleeding, infection.    Preadmission testing: CBC, BMP, EKG      History of Present Illness   Sofiya Hogan is a 69 y.o. female who presents with a 2-year history of left sided chest wall pain.    HPI  Patient states that she has been aching pain along the left side of the chest wall sometimes related to activity.  The pain has been persistent.  She underwent a CT scan of the chest and abdomen.  This has shown a 9 cm mass in the left subscapular area consistent with a lipoma.  This is the probable etiology of her pain.    Review of Systems as per HPI.  No cardiopulmonary problems  No history of hypertension or diabetes    Pertinent Medical History   History of multicentric DCIS right breast.  Patient underwent a right mastectomy followed by placement of a breast implant.  This surgery was in .    Laparoscopic cholecystectomy in the     Patient lives with her boyfriend            Objective     /78 (BP Location: Left arm, Patient Position: Sitting, Cuff Size: Standard)   Pulse 88   Temp 98.5 °F (36.9 °C) (Tympanic)   Ht 4' 11\" (1.499 m)   Wt 54 kg (119 lb)   SpO2 97%   BMI 24.04 kg/m²      Physical Exam   No pallor or icterus    Heart sounds are normal  Chest is clear to auscultation  There is a palpable and a visible mass on the left side of the chest wall just lateral " to the scapula.  Abdominal exam is unremarkable    Radiology Results Review: I personally reviewed the following image studies in PACS and associated radiology reports: CT chest and CT abdomen/pelvis. My interpretation of the radiology images/reports is: Mass on the left chest wall, subscapular area.  Appears to be a lipoma.

## 2025-03-10 NOTE — H&P (VIEW-ONLY)
"Name: Sofiya Hogan      : 1955      MRN: [id number]  Encounter Provider:  Tapan Cabral MD  Encounter Date: 3/10/2025   Encounter department: [facility]AL SURG[facility] [place]  :  Assessment & Plan  Lipoma of torso    Orders:    Ambulatory Referral to General Surgery    Mass of left chest wall       Patient has a mass on the left chest wall in the subscapular area.  Patient is being scheduled for excision of the mass in the left chest wall under general anesthesia.  Procedure will be done in the right lateral decubitus position.    Procedure discussed with patient and informed consent obtained.  Risks include bleeding, infection.    Preadmission testing: CBC, BMP, EKG      History of Present Illness   Sofiya Hogan is a 69 y.o. female who presents with a 2-year history of left sided chest wall pain.    HPI  Patient states that she has been aching pain along the left side of the chest wall sometimes related to activity.  The pain has been persistent.  She underwent a CT scan of the chest and abdomen.  This has shown a 9 cm mass in the left subscapular area consistent with a lipoma.  This is the probable etiology of her pain.    Review of Systems as per HPI.  No cardiopulmonary problems  No history of hypertension or diabetes    Pertinent Medical History   History of multicentric DCIS right breast.  Patient underwent a right mastectomy followed by placement of a breast implant.  This surgery was in .    Laparoscopic cholecystectomy in the     Patient lives with her boyfriend            Objective     /78 (BP Location: Left arm, Patient Position: Sitting, Cuff Size: Standard)   Pulse 88   Temp 98.5 °F (36.9 °C) (Tympanic)   Ht 4' 11\" (1.499 m)   Wt 54 kg (119 lb)   SpO2 97%   BMI 24.04 kg/m²      Physical Exam   No pallor or icterus    Heart sounds are normal  Chest is clear to auscultation  There is a palpable and a visible mass on the left side of the chest wall just lateral " to the scapula.  Abdominal exam is unremarkable    Radiology Results Review: I personally reviewed the following image studies in PACS and associated radiology reports: CT chest and CT abdomen/pelvis. My interpretation of the radiology images/reports is: Mass on the left chest wall, subscapular area.  Appears to be a lipoma.

## 2025-03-11 LAB
ATRIAL RATE: 88 BPM
P AXIS: 36 DEGREES
PR INTERVAL: 142 MS
QRS AXIS: 34 DEGREES
QRSD INTERVAL: 78 MS
QT INTERVAL: 376 MS
QTC INTERVAL: 455 MS
T WAVE AXIS: 38 DEGREES
VENTRICULAR RATE: 88 BPM

## 2025-03-11 PROCEDURE — 93010 ELECTROCARDIOGRAM REPORT: CPT | Performed by: INTERNAL MEDICINE

## 2025-03-12 ENCOUNTER — NURSE TRIAGE (OUTPATIENT)
Age: 70
End: 2025-03-12

## 2025-03-12 NOTE — PRE-PROCEDURE INSTRUCTIONS
Pre-Surgery Instructions:   Medication Instructions    Cholecalciferol (VITAMIN D3) 125 MCG (5000 UT) TABS Stop taking 2 days prior to surgery.    Coenzyme Q10 (CoQ10) 30 MG CAPS Stop taking 2 days prior to surgery.    Cyanocobalamin (B-12 PO) Stop taking 2 days prior to surgery.    Magnesium 100 MG TABS Stop taking 2 days prior to surgery.    Menaquinone-7 (VITAMIN K2 PO) Stop taking 2 days prior to surgery.    TURMERIC CURCUMIN PO Stop taking 2 days prior to surgery.   Medication instructions for day of surgery reviewed. Please take all instructed medications with only a sip of water.       You will receive a call one business day prior to surgery with an arrival time and hospital directions. If your surgery is scheduled on a Monday, the hospital will be calling you on the Friday prior to your surgery. If you have not heard from anyone by 8pm, please call the hospital supervisor through the hospital  at [phone]. ([place] [phone] or [place] [phone]).    Do not eat or drink anything after midnight the night before your surgery, including candy, mints, lifesavers, or chewing gum. Do not drink alcohol 24hrs before your surgery. Try not to smoke at least 24hrs before your surgery.       Follow the pre surgery showering instructions as listed in the “My Surgical Experience Booklet” or otherwise provided by your surgeon's office. Do not use a blade to shave the surgical area 1 week before surgery. It is okay to use a clean electric clippers up to 24 hours before surgery. Do not apply any lotions, creams, including makeup, cologne, deodorant, or perfumes after showering on the day of your surgery. Do not use dry shampoo, hair spray, hair gel, or any type of hair products.     No contact lenses, eye make-up, or artificial eyelashes. Remove nail polish, including gel polish, and any artificial, gel, or acrylic nails if possible. Remove all jewelry including rings and body piercing jewelry.     Wear  causal clothing that is easy to take on and off. Consider your type of surgery.    Keep any valuables, jewelry, piercings at home. Please bring any specially ordered equipment (sling, braces) if indicated.    Arrange for a responsible person to drive you to and from the hospital on the day of your surgery. Please confirm the visitor policy for the day of your procedure when you receive your phone call with an arrival time.     Call the surgeon's office with any new illnesses, exposures, or additional questions prior to surgery.    Please reference your “My Surgical Experience Booklet” for additional information to prepare for your upcoming surgery.

## 2025-03-12 NOTE — TELEPHONE ENCOUNTER
"Patient calling in to report a onset of red rash on 3/9 with elevation with itchiness on left breast.  She reports small rash little larger than a quarter round on the bottom of the breast below nipple.  She denies any pian, fever or any other symptoms.  She reports is scheduled for surgery on 3/14 for a lipoma excision.  I advised her to try 1% Hydrocortisone cream 3 time daily for a couple of days and to call back if this should worsen, fever or any other symptoms should arise.  She thanked me.      FOLLOW UP: patient to call if not effective.     REASON FOR CONVERSATION: Rash    SYMPTOMS: red rash, itchiness    OTHER: .    DISPOSITION: No disposition on file.    Reason for Disposition   Mild localized rash    Answer Assessment - Initial Assessment Questions  1. APPEARANCE of RASH: \"Describe the rash.\"       Red rash with elevations    2. LOCATION: \"Where is the rash located?\"       Bottom of left breast under nipple    3. NUMBER: \"How many spots are there?\"       1    4. SIZE: \"How big are the spots?\" (Inches, centimeters or compare to size of a coin)       A little larger than a quarter round    5. ONSET: \"When did the rash start?\"       3/9    6. ITCHING: \"Does the rash itch?\" If Yes, ask: \"How bad is the itch?\"  (Scale 0-10; or none, mild, moderate, severe)      6/10    7. PAIN: \"Does the rash hurt?\" If Yes, ask: \"How bad is the pain?\"  (Scale 0-10; or none, mild, moderate, severe)      No    8. OTHER SYMPTOMS: \"Do you have any other symptoms?\" (e.g., fever)      No    Protocols used: Rash or Redness - Localized-Adult-[place]    "

## 2025-03-12 NOTE — TELEPHONE ENCOUNTER
Call received by Sofiya.     Patient called to make RN Manuel aware that she was able to send picture of issue on [employer].     Thanks!

## 2025-03-14 ENCOUNTER — HOSPITAL ENCOUNTER (OUTPATIENT)
Facility: HOSPITAL | Age: 70
Setting detail: OUTPATIENT SURGERY
Discharge: HOME | End: 2025-03-14
Attending: SURGERY | Admitting: SURGERY
Payer: COMMERCIAL

## 2025-03-14 ENCOUNTER — ANESTHESIA EVENT (OUTPATIENT)
Dept: PERIOP | Facility: HOSPITAL | Age: 70
End: 2025-03-14
Payer: COMMERCIAL

## 2025-03-14 ENCOUNTER — ANESTHESIA (OUTPATIENT)
Dept: PERIOP | Facility: HOSPITAL | Age: 70
End: 2025-03-14
Payer: COMMERCIAL

## 2025-03-14 VITALS
SYSTOLIC BLOOD PRESSURE: 125 MMHG | HEART RATE: 95 BPM | WEIGHT: 118.7 LBS | HEIGHT: 59 IN | DIASTOLIC BLOOD PRESSURE: 61 MMHG | OXYGEN SATURATION: 95 % | TEMPERATURE: 98.3 F | RESPIRATION RATE: 16 BRPM | BODY MASS INDEX: 23.93 KG/M2

## 2025-03-14 DIAGNOSIS — R22.2 MASS OF LEFT CHEST WALL: ICD-10-CM

## 2025-03-14 PROCEDURE — 88304 TISSUE EXAM BY PATHOLOGIST: CPT | Performed by: PATHOLOGY

## 2025-03-14 PROCEDURE — 21554 EXC NECK TUM DEEP 5 CM/>: CPT | Performed by: PHYSICIAN ASSISTANT

## 2025-03-14 PROCEDURE — 21554 EXC NECK TUM DEEP 5 CM/>: CPT | Performed by: SURGERY

## 2025-03-14 RX ORDER — MAGNESIUM HYDROXIDE 1200 MG/15ML
LIQUID ORAL AS NEEDED
Status: DISCONTINUED | OUTPATIENT
Start: 2025-03-14 | End: 2025-03-14 | Stop reason: HOSPADM

## 2025-03-14 RX ORDER — HYDROMORPHONE HCL IN WATER/PF 6 MG/30 ML
0.2 PATIENT CONTROLLED ANALGESIA SYRINGE INTRAVENOUS
Status: DISCONTINUED | OUTPATIENT
Start: 2025-03-14 | End: 2025-03-14 | Stop reason: HOSPADM

## 2025-03-14 RX ORDER — ONDANSETRON 2 MG/ML
INJECTION INTRAMUSCULAR; INTRAVENOUS AS NEEDED
Status: DISCONTINUED | OUTPATIENT
Start: 2025-03-14 | End: 2025-03-14

## 2025-03-14 RX ORDER — ALBUTEROL SULFATE 0.83 MG/ML
2.5 SOLUTION RESPIRATORY (INHALATION) ONCE AS NEEDED
Status: DISCONTINUED | OUTPATIENT
Start: 2025-03-14 | End: 2025-03-14 | Stop reason: HOSPADM

## 2025-03-14 RX ORDER — PROPOFOL 10 MG/ML
INJECTION, EMULSION INTRAVENOUS CONTINUOUS PRN
Status: DISCONTINUED | OUTPATIENT
Start: 2025-03-14 | End: 2025-03-14

## 2025-03-14 RX ORDER — LIDOCAINE HYDROCHLORIDE 10 MG/ML
INJECTION, SOLUTION EPIDURAL; INFILTRATION; INTRACAUDAL; PERINEURAL AS NEEDED
Status: DISCONTINUED | OUTPATIENT
Start: 2025-03-14 | End: 2025-03-14

## 2025-03-14 RX ORDER — SODIUM CHLORIDE, SODIUM LACTATE, POTASSIUM CHLORIDE, CALCIUM CHLORIDE 600; 310; 30; 20 MG/100ML; MG/100ML; MG/100ML; MG/100ML
INJECTION, SOLUTION INTRAVENOUS CONTINUOUS PRN
Status: DISCONTINUED | OUTPATIENT
Start: 2025-03-14 | End: 2025-03-14

## 2025-03-14 RX ORDER — CEFAZOLIN SODIUM 2 G/50ML
2000 SOLUTION INTRAVENOUS
Status: COMPLETED | OUTPATIENT
Start: 2025-03-14 | End: 2025-03-14

## 2025-03-14 RX ORDER — METOCLOPRAMIDE HYDROCHLORIDE 5 MG/ML
INJECTION INTRAMUSCULAR; INTRAVENOUS AS NEEDED
Status: DISCONTINUED | OUTPATIENT
Start: 2025-03-14 | End: 2025-03-14

## 2025-03-14 RX ORDER — PROPOFOL 10 MG/ML
INJECTION, EMULSION INTRAVENOUS AS NEEDED
Status: DISCONTINUED | OUTPATIENT
Start: 2025-03-14 | End: 2025-03-14

## 2025-03-14 RX ORDER — FENTANYL CITRATE/PF 50 MCG/ML
25 SYRINGE (ML) INJECTION
Status: DISCONTINUED | OUTPATIENT
Start: 2025-03-14 | End: 2025-03-14 | Stop reason: HOSPADM

## 2025-03-14 RX ORDER — PHENYLEPHRINE HCL IN 0.9% NACL 1 MG/10 ML
SYRINGE (ML) INTRAVENOUS AS NEEDED
Status: DISCONTINUED | OUTPATIENT
Start: 2025-03-14 | End: 2025-03-14

## 2025-03-14 RX ORDER — OXYCODONE AND ACETAMINOPHEN 5; 325 MG/1; MG/1
1 TABLET ORAL EVERY 4 HOURS PRN
Qty: 15 TABLET | Refills: 0 | Status: SHIPPED | OUTPATIENT
Start: 2025-03-14 | End: 2025-03-24

## 2025-03-14 RX ORDER — BUPIVACAINE HYDROCHLORIDE AND EPINEPHRINE 2.5; 5 MG/ML; UG/ML
INJECTION, SOLUTION EPIDURAL; INFILTRATION; INTRACAUDAL; PERINEURAL AS NEEDED
Status: DISCONTINUED | OUTPATIENT
Start: 2025-03-14 | End: 2025-03-14 | Stop reason: HOSPADM

## 2025-03-14 RX ORDER — FENTANYL CITRATE 50 UG/ML
INJECTION, SOLUTION INTRAMUSCULAR; INTRAVENOUS AS NEEDED
Status: DISCONTINUED | OUTPATIENT
Start: 2025-03-14 | End: 2025-03-14

## 2025-03-14 RX ORDER — DEXAMETHASONE SODIUM PHOSPHATE 10 MG/ML
INJECTION, SOLUTION INTRAMUSCULAR; INTRAVENOUS AS NEEDED
Status: DISCONTINUED | OUTPATIENT
Start: 2025-03-14 | End: 2025-03-14

## 2025-03-14 RX ORDER — DIPHENHYDRAMINE HYDROCHLORIDE 50 MG/ML
INJECTION INTRAMUSCULAR; INTRAVENOUS AS NEEDED
Status: DISCONTINUED | OUTPATIENT
Start: 2025-03-14 | End: 2025-03-14

## 2025-03-14 RX ADMIN — FENTANYL CITRATE 25 MCG: 50 INJECTION INTRAMUSCULAR; INTRAVENOUS at 10:11

## 2025-03-14 RX ADMIN — PROPOFOL 150 MG: 10 INJECTION, EMULSION INTRAVENOUS at 09:23

## 2025-03-14 RX ADMIN — PROPOFOL 50 MG: 10 INJECTION, EMULSION INTRAVENOUS at 09:48

## 2025-03-14 RX ADMIN — FENTANYL CITRATE 25 MCG: 50 INJECTION INTRAMUSCULAR; INTRAVENOUS at 10:06

## 2025-03-14 RX ADMIN — DEXAMETHASONE SODIUM PHOSPHATE 10 MG: 10 INJECTION, SOLUTION INTRAMUSCULAR; INTRAVENOUS at 09:28

## 2025-03-14 RX ADMIN — PROPOFOL 140 MCG/KG/MIN: 10 INJECTION, EMULSION INTRAVENOUS at 09:24

## 2025-03-14 RX ADMIN — Medication 100 MCG: at 09:51

## 2025-03-14 RX ADMIN — FENTANYL CITRATE 25 MCG: 50 INJECTION INTRAMUSCULAR; INTRAVENOUS at 10:25

## 2025-03-14 RX ADMIN — FENTANYL CITRATE 25 MCG: 50 INJECTION INTRAMUSCULAR; INTRAVENOUS at 09:19

## 2025-03-14 RX ADMIN — PROPOFOL 120 MCG/KG/MIN: 10 INJECTION, EMULSION INTRAVENOUS at 10:26

## 2025-03-14 RX ADMIN — DIPHENHYDRAMINE HYDROCHLORIDE 12.5 MG: 50 INJECTION, SOLUTION INTRAMUSCULAR; INTRAVENOUS at 09:31

## 2025-03-14 RX ADMIN — LIDOCAINE HYDROCHLORIDE 50 MG: 10 INJECTION, SOLUTION EPIDURAL; INFILTRATION; INTRACAUDAL at 09:23

## 2025-03-14 RX ADMIN — FENTANYL CITRATE 25 MCG: 50 INJECTION INTRAMUSCULAR; INTRAVENOUS at 09:48

## 2025-03-14 RX ADMIN — FENTANYL CITRATE 25 MCG: 50 INJECTION INTRAMUSCULAR; INTRAVENOUS at 10:03

## 2025-03-14 RX ADMIN — CEFAZOLIN SODIUM 2000 MG: 2 SOLUTION INTRAVENOUS at 09:17

## 2025-03-14 RX ADMIN — FENTANYL CITRATE 25 MCG: 50 INJECTION INTRAMUSCULAR; INTRAVENOUS at 09:33

## 2025-03-14 RX ADMIN — SODIUM CHLORIDE, SODIUM LACTATE, POTASSIUM CHLORIDE, AND CALCIUM CHLORIDE: .6; .31; .03; .02 INJECTION, SOLUTION INTRAVENOUS at 09:17

## 2025-03-14 RX ADMIN — METOCLOPRAMIDE HYDROCHLORIDE 5 MG: 5 INJECTION INTRAMUSCULAR; INTRAVENOUS at 09:34

## 2025-03-14 RX ADMIN — ONDANSETRON 4 MG: 2 INJECTION INTRAMUSCULAR; INTRAVENOUS at 09:20

## 2025-03-14 RX ADMIN — FENTANYL CITRATE 25 MCG: 50 INJECTION INTRAMUSCULAR; INTRAVENOUS at 09:40

## 2025-03-14 RX ADMIN — SODIUM CHLORIDE, SODIUM LACTATE, POTASSIUM CHLORIDE, AND CALCIUM CHLORIDE: .6; .31; .03; .02 INJECTION, SOLUTION INTRAVENOUS at 10:11

## 2025-03-14 NOTE — DISCHARGE INSTR - AVS FIRST PAGE
Postoperative Care Instructions      1. General: You may feel pulling sensations around the wound or funny aches and pains around the incisions. This is normal. Even minor surgery is a change in your body and this is your body's reaction to it. If you have had abdominal surgery, it may help to support the incision with a small pillow or blanket for comfort when moving or coughing.    2. Wound care:  You may remove your dressing in 48 hours. Keep the area covered with a clean dry dressing. The steri strips over the incisions will fall off over the next week or two.    3. Showering: You may shower. Please do not soak wound in standing water such as a bath, hot tub, pool, lake, etc. Do not scrub or use exfoliants on the surgical wounds.    4. Activity: You may go up and down stairs, walk as much as you are comfortable, but walk at least 3 times each day. If you have had abdominal surgery, do not perform any strenuous exercise or lift anything heavier than 15 pounds for at least 4 weeks, unless cleared by your physician.    5. Diet: You may resume your regular diet. Please drink lots of water.    6. Medications: Resume all of your previous medications, unless told otherwise by the doctor.  A good option for pain control is to start with acetaminophen (Tylenol) 650mg and ibuprofen (Advil) 600mg and alternate taking them every 3 hours.  If this is not sufficient, you make take the narcotic pain medicine as prescribed. You do not need to take the narcotic pain medication unless you are having significant pain and discomfort. Please take the narcotic medication with food. Ensure that you do not take more than 4000 mg of Tylenol per day.     7. Driving: You will need someone to drive you home on the day of surgery. Do not drive or make any important decisions while on narcotic pain medication. Generally, you may drive 48 hours after you've stopped taking all narcotic pain medications. Generally, you may drive when you are  off all narcotic pain medications, and you can turn in your seat comfortably to check your blind spot and area able to slam on the brakes while driving if needed.     8. Upset Stomach: You may take Maalox, Tums, or similar items for an upset stomach. If your narcotic pain medication causes an upset stomach, do not take it on an empty stomach. Try taking it with at least some crackers or toast.     9. Constipation: Patients often experience constipation after surgery. We recommend starting an over-the-counter medication for this, such as Metamucil, Senokot, Colace, milk of magnesia, etc. You may stop taking these medications a couple days after your last dose of narcotic medication. If you experience significant nausea or vomiting after abdominal surgery, call the office before trying any of these medications.     10. Call the office: If you are experiencing any of the following: fevers above 101.5°, significant nausea or vomiting, if the wound develops drainage and/or excessive redness around the wound, or if you have significant diarrhea or other worsening symptoms.    11. Pain: A prescription for narcotic pain medication will be sent to your pharmacy upon discharge from the hospital.

## 2025-03-14 NOTE — OP NOTE
OPERATIVE REPORT  PATIENT NAME: Sofiya Hogan    :  1955  MRN: [id number]  Pt Location: [facility] OR ROOM 02    SURGERY DATE: 3/14/2025    Surgeons and Role:     * Tapan Cabral MD - Primary     * Mary Bellamy PA-C - Assisting    Preop Diagnosis:  Mass of left chest wall [R22.2]    Post-Op Diagnosis Codes:     * Mass of left chest wall [R22.2]  Deep subfascial left chest wall mass, size 9 cm    Procedure(s):  Left - EXCISION  BIOPSY DEEP LEFT CHEST WALL MASS    Specimen(s):  ID Type Source Tests Collected by Time Destination   1 : Left chest wall mass Tissue Mass TISSUE EXAM Tapan Cabral MD 3/14/2025 1028        Estimated Blood Loss:   20 mL    Drains:  Closed/Suction Drain Right Breast Bulb 15 Fr. (Active)   Number of days: 1660       Closed/Suction Drain Right Breast Bulb 15 Fr. (Active)   Number of days: 1660       Anesthesia Type:   General endotracheal anesthesia and local anesthesia used is 30 mL of 0.25% Marcaine with 1 in 200,000 epinephrine    Operative Indications:  Mass of left chest wall [R22.2]  This is a 69-year-old female who was seen in the office with several months history of left-sided chest wall pain.  She underwent a CT scan of the chest and abdomen which showed a deep left sided chest wall mass extending from the undersurface of the scapula to the lateral chest wall.  The mass measured 9 cm and on the x-rays look like a lipoma.    Operative Findings:  The mass was deep lying on the rib cage.  The mass extended from the undersurface of the scapula to the left axilla.  The mass was under the latissimus dorsi and subscapularis muscle.  Grossly this looked like a lipoma and measured 9 cm in size.      Complications:   None    Procedure and Technique:  Patient was brought to the operating room suite.  She was identified by me.  She was laid supine on the operating table.  General endotracheal anesthesia was given.  Patient was then turned into a right lateral  decubitus position and placed on the beanbag.  The skin was cleaned with ChloraPrep and patient was draped.  Local infiltration anesthesia was given.  A transverse incision was made just inferior to the left axilla dividing the skin and subcutaneous tissues.  The deep fascia was incised and the latissimus dorsi and the subscapularis muscles were identified.  Under these muscles, the lateral edge of the scapula was palpated.  The mass as described was extending from under the scapula towards the axilla.  The mass was bluntly dissected out.  The wound was then irrigated and it was ensured that hemostasis was adequate.    Subcutaneous tissues were closed with interrupted 3-0 Vicryl.  Marcaine was injected postop for pain relief.  Skin was closed with a continuous subcuticular 4-0 Monocryl.  Benzoin and Steri-Strips were applied.  A gauze and Tegaderm dressing was placed.    Patient tolerated procedure well.    I was present for the entire procedure. A qualified resident physician was not available. A physician assistant was required during the procedure for retraction, tissue handling, dissection and suturing.    Patient Disposition:  PACU              SIGNATURE:  Tapan Cabral MD  DATE: March 14, 2025  TIME: 10:35 AM

## 2025-03-14 NOTE — ANESTHESIA PREPROCEDURE EVALUATION
Procedure:  EXCISION  BIOPSY LESION/MASS ABDOMINAL/CHEST WALL (Left: Chest)    Relevant Problems   No relevant active problems        Physical Exam    Airway    Mallampati score: III  TM Distance: >3 FB  Neck ROM: full     Dental   No notable dental hx     Cardiovascular  Cardiovascular exam normal    Pulmonary  Pulmonary exam normal     Other Findings  post-pubertal.      Anesthesia Plan  ASA Score- 2     Anesthesia Type- general with ASA Monitors.         Additional Monitors:     Airway Plan: LMA.           Plan Factors-Exercise tolerance (METS): >4 METS.    Chart reviewed. EKG reviewed.  Existing labs reviewed. Patient summary reviewed.    Patient is not a current smoker.              Induction- intravenous.    Postoperative Plan- Plan for postoperative opioid use.         Informed Consent- Anesthetic plan and risks discussed with patient.  I personally reviewed this patient with the CRNA. Discussed and agreed on the Anesthesia Plan with the CRNA..      NPO Status:  Vitals Value Taken Time   Date of last liquid 03/13/25 03/14/25 0817   Time of last liquid 2300 03/14/25 0817   Date of last solid 03/13/25 03/14/25 0817   Time of last solid 1900 03/14/25 0817

## 2025-03-14 NOTE — INTERVAL H&P NOTE
H&P reviewed. After examining the patient I find no changes in the patients condition since the H&P had been written.    Vitals:    03/14/25 0827   BP: 136/65   Pulse: 103   Resp: 18   Temp: 98.3 °F (36.8 °C)   SpO2: 97%

## 2025-03-14 NOTE — ANESTHESIA POSTPROCEDURE EVALUATION
Post-Op Assessment Note    CV Status:  Stable  Pain Score: 0    Pain management: adequate    Multimodal analgesia used between 6 hours prior to anesthesia start to PACU discharge    Mental Status:  Alert and awake   Hydration Status:  Euvolemic and stable   PONV Controlled:  Controlled   Airway Patency:  Patent  Two or more mitigation strategies used for obstructive sleep apnea   Post Op Vitals Reviewed: Yes    No anethesia notable event occurred.    Staff: CRNA           Last Filed PACU Vitals:  Vitals Value Taken Time   Temp 97.2    Pulse 91 03/14/25 1052   /60 03/14/25 1052   Resp 16 03/14/25 1052   SpO2 98 % 03/14/25 1052   Vitals shown include unfiled device data.

## 2025-03-15 NOTE — ANESTHESIA POSTPROCEDURE EVALUATION
Post-Op Assessment Note    Last Filed PACU Vitals:  Vitals Value Taken Time   Temp 97.7 °F (36.5 °C) 03/14/25 1121   Pulse 90 03/14/25 1122   /70 03/14/25 1122   Resp 22 03/14/25 1122   SpO2 95 % 03/14/25 1122   Vitals shown include unfiled device data.    Modified Alberto:     Vitals Value Taken Time   Activity 2 03/14/25 1121   Respiration 2 03/14/25 1121   Circulation 2 03/14/25 1121   Consciousness 2 03/14/25 1121   Oxygen Saturation 2 03/14/25 1121     Modified Alberto Score: 10

## 2025-03-17 ENCOUNTER — TELEPHONE (OUTPATIENT)
Age: 70
End: 2025-03-17

## 2025-03-17 ENCOUNTER — TELEPHONE (OUTPATIENT)
Dept: SURGICAL ONCOLOGY | Facility: CLINIC | Age: 70
End: 2025-03-17

## 2025-03-17 NOTE — TELEPHONE ENCOUNTER
Called patient to discuss breast rash. This appears to be shingles. She states she already spoke to her PCP and she is on the way to the pharmacy now to  an anti viral

## 2025-03-17 NOTE — TELEPHONE ENCOUNTER
Received a phone call from patient.  Patient sent two more pictures of rash on left breast that she has been c/o since last week, prior to surgery.  Patient underwent an excision biopsy of deep left chest wall mass on 3/14.  Patient stated that it is a burning, painful sensation and itches.  Patient has been applying cortisone cream, but is not receiving any relief.  Patient has not received Shingles vaccine.  Advised patient to reach out to PCP.  Patient verbalized understanding.

## 2025-03-20 PROCEDURE — 88304 TISSUE EXAM BY PATHOLOGIST: CPT | Performed by: PATHOLOGY

## 2025-03-27 ENCOUNTER — OFFICE VISIT (OUTPATIENT)
Dept: SURGERY | Facility: CLINIC | Age: 70
End: 2025-03-27

## 2025-03-27 VITALS
SYSTOLIC BLOOD PRESSURE: 126 MMHG | TEMPERATURE: 97.7 F | BODY MASS INDEX: 24.19 KG/M2 | DIASTOLIC BLOOD PRESSURE: 84 MMHG | HEART RATE: 82 BPM | WEIGHT: 120 LBS | OXYGEN SATURATION: 98 % | HEIGHT: 59 IN

## 2025-03-27 DIAGNOSIS — D17.1: Primary | ICD-10-CM

## 2025-03-27 PROBLEM — R22.2 MASS OF LEFT CHEST WALL: Status: RESOLVED | Noted: 2025-03-14 | Resolved: 2025-03-27

## 2025-03-27 PROCEDURE — 99024 POSTOP FOLLOW-UP VISIT: CPT | Performed by: SURGERY

## 2025-03-27 NOTE — PROGRESS NOTES
"Assessment/Plan:     Diagnoses and all orders for this visit:    Lipoma of posterior chest wall     Discharge and see as needed    Pathology report checked.    Subjective:      Patient ID: Sofiya Hogan is a 69 y.o. female.    HPI  Patient is 2 weeks status post excision of a left posterior chest wall mass.  This was between the scapula and the ribs.  The report shows this to be a deep intermuscular lipoma.    Patient has postsurgical incisional pain but her preoperative symptom of left-sided chest pain has resolved.    The following portions of the patient's history were reviewed and updated as appropriate: allergies, current medications, past family history, past medical history, past social history, past surgical history, and problem list.    Review of Systems    No other complaints    Objective:    /84 (BP Location: Left arm, Patient Position: Sitting, Cuff Size: Standard)   Pulse 82   Temp 97.7 °F (36.5 °C) (Tympanic)   Ht 4' 11\" (1.499 m)   Wt 54.4 kg (120 lb)   SpO2 98%   BMI 24.24 kg/m²      Physical Exam    The left posterior chest wall incision has healed satisfactorily  "

## 2025-04-24 ENCOUNTER — TELEPHONE (OUTPATIENT)
Age: 70
End: 2025-04-24

## 2025-04-24 NOTE — TELEPHONE ENCOUNTER
Caller: Namita Hogan    Doctor: Dr. Alex    Reason for call: appt/checked chart for date last seen and issue as Dr does not do routine nail care/same issue w/ left foot/worse/scheduled    Call back#: NA

## 2025-05-01 NOTE — TELEPHONE ENCOUNTER
Patient is not currently experiencing any symptoms of fever, cough, shortness of breath, chills, repeated shaking with chills, muscle pain, headache, sore throat, or new loss of taste/smell  Patient has not been tested for COVID-19  Patient has not been in contact with someone confirmed to have COVID-19  Reviewed masking policy with patient  Reviewed visitor policy with patient  oriented to person, place, time and situation

## 2025-05-15 ENCOUNTER — OFFICE VISIT (OUTPATIENT)
Dept: PODIATRY | Facility: CLINIC | Age: 70
End: 2025-05-15
Payer: COMMERCIAL

## 2025-05-15 ENCOUNTER — TELEPHONE (OUTPATIENT)
Age: 70
End: 2025-05-15

## 2025-05-15 ENCOUNTER — APPOINTMENT (OUTPATIENT)
Dept: RADIOLOGY | Age: 70
End: 2025-05-15
Attending: PODIATRIST
Payer: COMMERCIAL

## 2025-05-15 VITALS — BODY MASS INDEX: 24.19 KG/M2 | HEIGHT: 59 IN | WEIGHT: 120 LBS

## 2025-05-15 DIAGNOSIS — M19.072 DJD (DEGENERATIVE JOINT DISEASE), ANKLE AND FOOT, LEFT: ICD-10-CM

## 2025-05-15 DIAGNOSIS — M76.72 PERONEUS BREVIS TENDINITIS, LEFT: ICD-10-CM

## 2025-05-15 DIAGNOSIS — M79.672 LEFT FOOT PAIN: Primary | ICD-10-CM

## 2025-05-15 PROCEDURE — 73630 X-RAY EXAM OF FOOT: CPT

## 2025-05-15 PROCEDURE — 99214 OFFICE O/P EST MOD 30 MIN: CPT | Performed by: PODIATRIST

## 2025-05-15 NOTE — PROGRESS NOTES
"  Name: Sofiya Hogan      : 1955      MRN: [id number]  Encounter Provider: Michael Alex DPM  Encounter Date: 5/15/2025   Encounter department: [facility] PODIATRY [facility]    Assessment & Plan     1. Left foot pain  -     XR foot 3+ vw left  -     MRI ankle/heel left wo contrast; Future; Expected date: 05/15/2025  2. DJD (degenerative joint disease), ankle and foot, left  -     MRI ankle/heel left wo contrast; Future; Expected date: 05/15/2025  3. Peroneus brevis tendinitis, left      Patient left foot pain is continuing to worsen.  On x-ray we did note there is some narrowing of the calcaneocuboid joint that might be the source of her pain inflammation in the area.  At this time we discussed doing a diagnostic and therapeutic injection with her versus doing an MRI.  Patient would prefer to have MRI performed first.      Return for After MRI.    Subjective     Patient returns to clinic today.  States that she is experience more pain.  States that when she is getting out of a truck she puts her left foot down first, and often has pain that is very sharp and extremely painful to the point bring her to tears.  She has tried using arch supports, but has not had improvement from that.        Constitutional:  Negative for chills and fever.   Respiratory:  Negative for chest tightness and shortness of breath.    Gastrointestinal:  Negative for nausea and vomiting.     Medications[1]    Objective     Ht 4' 11\" (1.499 m) Comment: verbal  Wt 54.4 kg (120 lb)   BMI 24.24 kg/m²     Vascular: Intact pedal pulses bilateral DP and PT.  Neurological: Gross protective sensation intact bilateral  Musculoskeletal: Muscle strength bilateral intact with dorsiflexion, inversion, eversion and plantarflexion.  There is tenderness noted on palpation to sinus tarsi left foot.  Tenderness on palpation to the peroneus brevis insertion at the fifth metatarsal base.  No tenderness on palpation to the anterior " ankle joint, lateral malleolus.  No pain on subtalar joint or ankle joint into motion.  Dermatological: No open lesions or ulcerations noted bilateral.           [1]   Current Outpatient Medications on File Prior to Visit   Medication Sig    Cholecalciferol (VITAMIN D3) 125 MCG (5000 UT) TABS Take 5,000 Units by mouth daily    Coenzyme Q10 (CoQ10) 30 MG CAPS Take 30 mg by mouth in the morning    Cyanocobalamin (B-12 PO) Take 1,000 mcg by mouth every other day     Magnesium 100 MG TABS Take by mouth    Menaquinone-7 (VITAMIN K2 PO) Take by mouth daily    TURMERIC CURCUMIN PO Take 1 capsule by mouth daily Unsure of dose

## 2025-06-07 ENCOUNTER — HOSPITAL ENCOUNTER (OUTPATIENT)
Dept: RADIOLOGY | Facility: HOSPITAL | Age: 70
Discharge: HOME | End: 2025-06-07
Attending: PODIATRIST
Payer: COMMERCIAL

## 2025-06-07 DIAGNOSIS — M19.072 DJD (DEGENERATIVE JOINT DISEASE), ANKLE AND FOOT, LEFT: ICD-10-CM

## 2025-06-07 DIAGNOSIS — M79.672 LEFT FOOT PAIN: ICD-10-CM

## 2025-06-07 PROCEDURE — 73721 MRI JNT OF LWR EXTRE W/O DYE: CPT

## 2025-06-11 ENCOUNTER — OFFICE VISIT (OUTPATIENT)
Dept: PODIATRY | Facility: CLINIC | Age: 70
End: 2025-06-11
Payer: COMMERCIAL

## 2025-06-11 VITALS — HEIGHT: 59 IN | BODY MASS INDEX: 23.99 KG/M2 | WEIGHT: 119 LBS

## 2025-06-11 DIAGNOSIS — Q66.71 CAVUS DEFORMITY OF BOTH FEET: ICD-10-CM

## 2025-06-11 DIAGNOSIS — M19.072 DJD (DEGENERATIVE JOINT DISEASE), ANKLE AND FOOT, LEFT: ICD-10-CM

## 2025-06-11 DIAGNOSIS — M84.375G STRESS REACTION OF LEFT FOOT WITH DELAYED HEALING, SUBSEQUENT ENCOUNTER: Primary | ICD-10-CM

## 2025-06-11 DIAGNOSIS — Q66.72 CAVUS DEFORMITY OF BOTH FEET: ICD-10-CM

## 2025-06-11 PROCEDURE — 99213 OFFICE O/P EST LOW 20 MIN: CPT | Performed by: PODIATRIST

## 2025-06-11 NOTE — PROGRESS NOTES
"Name: Sofiya Hogan      : 1955      MRN: [id number]  Encounter Provider: Michael Alex DPM  Encounter Date: 2025   Encounter department: [facility] PODIATRY [place]  :  Assessment & Plan  Stress reaction of left foot with delayed healing, subsequent encounter  DJD (degenerative joint disease), ankle and foot, left  Cavus deformity of both feet    Patient  has been struggling to find relief from the foot pain. Given the findings on MRI and her foot structure, which shows cavus foot type, we suspect the lateral column overloading that is occurring is causing her pain and needs to be supported with custom orthotics. Recommend she also start wearing the boot again to calm down pain/inflammation. She declined NSAIDs/dosepack Rx. Did explain to her next step could include a CCJ fusion should pain continue.    RTC: 12 weeks recheck     Orders:    Ambulatory referral to Physical Therapy; Future        History of Present Illness   HPI  Sofiya Hogan is a 69 y.o. female who presents for f/u of left lateral foot pain. She had some relief wearing the boot previously, but has not worn it of late. She states her pain is as bad as ever, and has been going on for 5 years now. States that after activity is when she has the most pain, and it feels like it locks up. Patient wants to avoid an injection 'at all costs'.      Review of Systems       Objective   Ht 4' 11\" (1.499 m) Comment: verbal  Wt 54 kg (119 lb)   BMI 24.04 kg/m²      Physical Exam  Vascular: Intact pedal pulses bilateral DP and PT.  Neurological: Gross protective sensation intact bilateral  Dermatological: No open lesions or ulcerations noted bilateral.  Musculoskeletal: Muscle strength bilateral intact with dorsiflexion, inversion, eversion and plantarflexion.  There is pinpoint tenderness noted on palpation to left cuboid. No pain on palpation to metatarsals, TMTs, calcaneus, or peroneal tendons.      "

## 2025-06-16 ENCOUNTER — OFFICE VISIT (OUTPATIENT)
Dept: PHYSICAL THERAPY | Facility: CLINIC | Age: 70
End: 2025-06-16
Attending: PODIATRIST

## 2025-06-16 ENCOUNTER — EVALUATION (OUTPATIENT)
Dept: PHYSICAL THERAPY | Facility: CLINIC | Age: 70
End: 2025-06-16
Attending: PODIATRIST
Payer: COMMERCIAL

## 2025-06-16 DIAGNOSIS — M79.672 LEFT FOOT PAIN: Primary | ICD-10-CM

## 2025-06-16 PROCEDURE — 97161 PT EVAL LOW COMPLEX 20 MIN: CPT | Performed by: PHYSICAL THERAPIST

## 2025-06-16 NOTE — PROGRESS NOTES
PT Evaluation     Today's date: 2025  Patient name: Sofiya Hogan  : 1955  MRN: [id number]  Referring provider: Michael Alex,*  Dx:   Encounter Diagnosis     ICD-10-CM    1. Left foot pain  M79.672                      Assessment  Impairments: pain with function and weight-bearing intolerance  Functional limitations: pain with prolonged WB    Assessment details: Pt is 68yo female presenting to therapy following chronic Lt foot pain. Pt has lateral foot strike, decreased rearfoot mobility that is increasing load to lateral column. Pt has pain with palpation on cuboid. Pt would benefit from custom orthotics to improve gait mechanics and reduce load on lateral column.    Goals  1. Pt will be scanned for custom orthotics.  2. Pt will understand wearing schedule for orthotics.  3. Pt will be fit for orthotics once received from lab.  4. Pt will report compliance with wearing orthotics.    Plan  Patient would benefit from: orthotics    Frequency: 1x week  Duration in weeks: 2  Treatment plan discussed with: patient      Subjective Evaluation    History of Present Illness  Mechanism of injury: Pt has been dealing with foot pain for 5 years now. She has had an MRI most recently that shows bone marrow edema on her Lt cuboid. Pt reports increased pain when stepping out of her truck onto that foot. Pt reports walking on the trail a lot and doesn't have pain during the walk but later that evening would be burning. Pt notes ice improves Pt notes pain. Pt reports barefoot increases pain, always wearing sneakers. New Balance is the best. She can't tie them She has tried arch supports that did not help her pain. PMH of bunionectomy b/l  Pain  Current pain ratin  Location: Lt midfoot  Quality: sharp        Objective     Static Posture     Comments  LMI: 4deg b/l    Non WB: Lt gastroc flexibility decreased, decreased ankle strength in all directions, 4/5 grossly, decreased rearfoot mobility on Lt, pain on  palpation to plantar and dorsum of midfoot    WB: lateral foot strike on Lt, with forefoot pronation. Moderate pronation with functional squatting.             Precautions: none      Manuals 6/16            Scanned for custom orthotics FH                                                   Neuro Re-Ed                                                                                                        Ther Ex                                                                                                                     Ther Activity                                       Gait Training                                       Modalities

## 2025-06-24 ENCOUNTER — OFFICE VISIT (OUTPATIENT)
Dept: PHYSICAL THERAPY | Facility: CLINIC | Age: 70
End: 2025-06-24
Attending: PODIATRIST
Payer: COMMERCIAL

## 2025-06-24 ENCOUNTER — HOSPITAL ENCOUNTER (OUTPATIENT)
Dept: MAMMOGRAPHY | Facility: HOSPITAL | Age: 70
Discharge: HOME | End: 2025-06-24
Payer: COMMERCIAL

## 2025-06-24 VITALS — BODY MASS INDEX: 23.99 KG/M2 | WEIGHT: 119 LBS | HEIGHT: 59 IN

## 2025-06-24 DIAGNOSIS — Z12.31 VISIT FOR SCREENING MAMMOGRAM: ICD-10-CM

## 2025-06-24 DIAGNOSIS — M79.672 LEFT FOOT PAIN: Primary | ICD-10-CM

## 2025-06-24 PROCEDURE — L3010 FOOT LONGITUDINAL ARCH SUPPO: HCPCS | Performed by: PHYSICAL THERAPIST

## 2025-06-24 PROCEDURE — 77067 SCR MAMMO BI INCL CAD: CPT

## 2025-06-24 PROCEDURE — 77063 BREAST TOMOSYNTHESIS BI: CPT

## 2025-08-12 ENCOUNTER — OFFICE VISIT (OUTPATIENT)
Dept: URGENT CARE | Age: 70
End: 2025-08-12
Payer: COMMERCIAL

## 2025-08-12 ENCOUNTER — APPOINTMENT (OUTPATIENT)
Dept: RADIOLOGY | Age: 70
End: 2025-08-12
Attending: PHYSICIAN ASSISTANT
Payer: COMMERCIAL

## 2025-08-25 PROBLEM — S69.92XA INJURY OF RING FINGER, LEFT, INITIAL ENCOUNTER: Status: ACTIVE | Noted: 2025-08-25

## (undated) DEVICE — SCD SEQUENTIAL COMPRESSION COMFORT SLEEVE MEDIUM KNEE LENGTH: Brand: KENDALL SCD

## (undated) DEVICE — BULB SYRINGE,IRRIGATION WITH PROTECTIVE CAP: Brand: DOVER

## (undated) DEVICE — SYRINGE 20ML LL

## (undated) DEVICE — DRESSING BIOPATCH ANTIMICROBIAL 1 IN DISC

## (undated) DEVICE — ADHESIVE SKIN HIGH VISCOSITY EXOFIN 1ML

## (undated) DEVICE — 450 ML BOTTLE OF 0.05% CHLORHEXIDINE GLUCONATE IN 99.95% STERILE WATER FOR IRRIGATION, USP AND APPLICATOR.: Brand: IRRISEPT ANTIMICROBIAL WOUND LAVAGE

## (undated) DEVICE — KERLIX BANDAGE ROLL: Brand: KERLIX

## (undated) DEVICE — TOWEL SURG XR DETECT GREEN STRL RFD

## (undated) DEVICE — SUT MONOCRYL 4-0 PC-3 18 IN Y845G

## (undated) DEVICE — ELECTRODE BLADE MOD  E-Z CLEAN 6.5IN -0014M

## (undated) DEVICE — NEEDLE 25G X 1 1/2

## (undated) DEVICE — SYRINGE 50ML LL

## (undated) DEVICE — 3M™ STERI-STRIP™ COMPOUND BENZOIN TINCTURE 40 BAGS/CARTON 4 CARTONS/CASE C1544: Brand: 3M™ STERI-STRIP™

## (undated) DEVICE — PACK GENERAL LF

## (undated) DEVICE — PENCIL ELECTROSURG E-Z CLEAN -0035H

## (undated) DEVICE — SMOKE EVACUATION TUBING WITH 8 IN INTEGRAL WAND AND SPONGE GUARD: Brand: BUFFALO FILTER

## (undated) DEVICE — GLOVE INDICATOR PI UNDERGLOVE SZ 7.5 BLUE

## (undated) DEVICE — PLUMEPEN PRO 10FT

## (undated) DEVICE — SUT ETHILON 3-0 PS-1 18 IN 1663G

## (undated) DEVICE — DRAPE EQUIPMENT RF WAND

## (undated) DEVICE — JP CHAN DRN SIL HUBLESS 15FR W/TRO: Brand: CARDINAL HEALTH

## (undated) DEVICE — FUNNEL E KELLER 2 DELIVERY DEVICE

## (undated) DEVICE — INTENDED FOR TISSUE SEPARATION, AND OTHER PROCEDURES THAT REQUIRE A SHARP SURGICAL BLADE TO PUNCTURE OR CUT.: Brand: BARD-PARKER SAFETY BLADES SIZE 15, STERILE

## (undated) DEVICE — SUT PDS II 2-0 CT-2 27 IN Z333H

## (undated) DEVICE — CHLORAPREP HI-LITE 26ML ORANGE

## (undated) DEVICE — TUBING SUCTION 5MM X 12 FT

## (undated) DEVICE — MEDI-VAC YANKAUER SUCTION HANDLE W/STRAIGHT TIP & CONTROL VENT: Brand: CARDINAL HEALTH

## (undated) DEVICE — BLANKET HYPOTHERMIA ADULT GAYMAR

## (undated) DEVICE — JACKSON-PRATT 100CC BULB RESERVOIR: Brand: CARDINAL HEALTH

## (undated) DEVICE — SUT MONOCRYL 4-0 PS-2 18 IN Y496G

## (undated) DEVICE — SUT VICRYL 2-0 REEL 54 IN J286G

## (undated) DEVICE — GLOVE SRG BIOGEL 7.5

## (undated) DEVICE — SUT SILK 2-0 SH 30 IN K833H

## (undated) DEVICE — DRAPE PROBE NEO-PROBE/ULTRASOUND

## (undated) DEVICE — UTILITY MARKER,BLACK WITH LABELS: Brand: DEVON

## (undated) DEVICE — BOWL: 16OZ PEELPOUCH 75/CS 16/PLT: Brand: MEDEGEN MEDICAL PRODUCTS, LLC

## (undated) DEVICE — SUT PDS II 2-0 CT-1 27 IN Z339H

## (undated) DEVICE — SUT VICRYL 2-0 SH 27 IN UNDYED J417H

## (undated) DEVICE — SAFETY PINS KIT: Brand: CARDINAL HEALTH

## (undated) DEVICE — SUT VICRYL 3-0 18 IN J904T

## (undated) DEVICE — STOPCOCK 3-WAY

## (undated) DEVICE — SKIN MARKER DUAL TIP WITH RULER CAP, FLEXIBLE RULER AND LABELS: Brand: DEVON

## (undated) DEVICE — ELECTRODE BLADE MOD E-Z CLEAN 2.5IN 6.4CM -0012M

## (undated) DEVICE — NEPTUNE E-SEP SMOKE EVACUATION PENCIL, COATED, 70MM BLADE, PUSH BUTTON SWITCH: Brand: NEPTUNE E-SEP

## (undated) DEVICE — SUT VICRYL 3-0 SH 27 IN J416H

## (undated) DEVICE — VIAL DECANTER

## (undated) DEVICE — DRAPE SHEET THREE QUARTER

## (undated) DEVICE — BAG DECANTER

## (undated) DEVICE — SUT MONOCRYL 3-0 PS-2 18 IN Y497G

## (undated) DEVICE — MEDI-VAC YANKAUER SUCTION HANDLE W/BULBOUS AND CONTROL VENT: Brand: CARDINAL HEALTH

## (undated) DEVICE — BETHLEHEM UNIVERSAL BREAST PK: Brand: CARDINAL HEALTH

## (undated) DEVICE — UNDYED MONOFILAMENT (POLYDIOXANONE), ABSORBABLE SURGICAL SUTURE: Brand: PDS

## (undated) DEVICE — STERILE DOUBLE BASIN SET PACK: Brand: CARDINAL HEALTH

## (undated) DEVICE — 3M™ STERI-STRIP™ REINFORCED ADHESIVE SKIN CLOSURES, R1547, 1/2 IN X 4 IN (12 MM X 100 MM), 6 STRIPS/ENVELOPE: Brand: 3M™ STERI-STRIP™

## (undated) DEVICE — CHEST/BREAST DRAPE: Brand: CONVERTORS

## (undated) DEVICE — SYRINGE 10ML LL CONTROL TOP

## (undated) DEVICE — SUT STRATAFIX SPIRAL 4-0 PGA/PCL 30 X 30 CM SXMD2B409

## (undated) DEVICE — GLOVE SRG BIOGEL 7

## (undated) DEVICE — INTENDED FOR TISSUE SEPARATION, AND OTHER PROCEDURES THAT REQUIRE A SHARP SURGICAL BLADE TO PUNCTURE OR CUT.: Brand: BARD-PARKER ® CARBON RIB-BACK BLADES

## (undated) DEVICE — BETHLEHEM UNIVERSAL LAPAROTOMY: Brand: CARDINAL HEALTH

## (undated) DEVICE — SUT STRATAFIX SPIRAL MONOCRYL PLUS 3-0 PS-2 45CM SXMP1B107

## (undated) DEVICE — SUT PDS II 2-0 CT-1 27 IN Z259H

## (undated) DEVICE — MODERATE PLUS PROFILE, M+ 500CC GEL SIZER: Brand: MENTOR MEMORYGEL RESTERILIZABLE GEL SIZER

## (undated) DEVICE — GROUNDING PAD UNIVERSAL SLW

## (undated) DEVICE — AAMI LEVEL 4 POLY-REINFORCED SURGICAL GOWN, X-LARGE, STERILE, SET-IN: Brand: CONVERTORS

## (undated) DEVICE — LABEL STERILE (RSC) (2-SENSOR CAINE 2-LIDOCAINE 2-HEPARIN)

## (undated) DEVICE — TRAY FOLEY 16FR URIMETER SURESTEP

## (undated) DEVICE — LIGHT HANDLE COVER SLEEVE DISP BLUE STELLAR

## (undated) DEVICE — ELECTRODE BLADE MOD E-Z CLEAN  2.75IN 7CM -0012AM

## (undated) DEVICE — 3M™ TEGADERM™ TRANSPARENT FILM DRESSING FRAME STYLE, 1626W, 4 IN X 4-3/4 IN (10 CM X 12 CM), 50/CT 4CT/CASE: Brand: 3M™ TEGADERM™

## (undated) DEVICE — ROSEBUD DISSECTORS: Brand: DEROYAL

## (undated) DEVICE — SUT STRATAFIX SPIRAL 3-0 PGA/PCL 30 X 30 CM SXMD2B408

## (undated) DEVICE — 2000CC GUARDIAN II: Brand: GUARDIAN